# Patient Record
Sex: MALE | Race: WHITE | NOT HISPANIC OR LATINO | Employment: OTHER | URBAN - METROPOLITAN AREA
[De-identification: names, ages, dates, MRNs, and addresses within clinical notes are randomized per-mention and may not be internally consistent; named-entity substitution may affect disease eponyms.]

---

## 2017-07-25 ENCOUNTER — ALLSCRIPTS OFFICE VISIT (OUTPATIENT)
Dept: OTHER | Facility: OTHER | Age: 82
End: 2017-07-25

## 2017-07-25 ENCOUNTER — TRANSCRIBE ORDERS (OUTPATIENT)
Dept: LAB | Facility: CLINIC | Age: 82
End: 2017-07-25

## 2017-07-25 ENCOUNTER — APPOINTMENT (OUTPATIENT)
Dept: LAB | Facility: CLINIC | Age: 82
End: 2017-07-25
Payer: MEDICARE

## 2017-07-25 DIAGNOSIS — M35.3 POLYMYALGIA RHEUMATICA (HCC): ICD-10-CM

## 2017-07-25 LAB
BASOPHILS # BLD AUTO: 0.04 THOUSANDS/ΜL (ref 0–0.1)
BASOPHILS NFR BLD AUTO: 0 % (ref 0–1)
CRP SERPL QL: 16 MG/L
EOSINOPHIL # BLD AUTO: 0.24 THOUSAND/ΜL (ref 0–0.61)
EOSINOPHIL NFR BLD AUTO: 3 % (ref 0–6)
ERYTHROCYTE [DISTWIDTH] IN BLOOD BY AUTOMATED COUNT: 14.5 % (ref 11.6–15.1)
ERYTHROCYTE [SEDIMENTATION RATE] IN BLOOD: 23 MM/HOUR (ref 0–10)
HCT VFR BLD AUTO: 42.8 % (ref 36.5–49.3)
HGB BLD-MCNC: 14.4 G/DL (ref 12–17)
LYMPHOCYTES # BLD AUTO: 1.58 THOUSANDS/ΜL (ref 0.6–4.47)
LYMPHOCYTES NFR BLD AUTO: 17 % (ref 14–44)
MCH RBC QN AUTO: 31.6 PG (ref 26.8–34.3)
MCHC RBC AUTO-ENTMCNC: 33.6 G/DL (ref 31.4–37.4)
MCV RBC AUTO: 94 FL (ref 82–98)
MONOCYTES # BLD AUTO: 0.98 THOUSAND/ΜL (ref 0.17–1.22)
MONOCYTES NFR BLD AUTO: 11 % (ref 4–12)
NEUTROPHILS # BLD AUTO: 6.45 THOUSANDS/ΜL (ref 1.85–7.62)
NEUTS SEG NFR BLD AUTO: 69 % (ref 43–75)
NRBC BLD AUTO-RTO: 0 /100 WBCS
PLATELET # BLD AUTO: 288 THOUSANDS/UL (ref 149–390)
PMV BLD AUTO: 11.8 FL (ref 8.9–12.7)
RBC # BLD AUTO: 4.55 MILLION/UL (ref 3.88–5.62)
WBC # BLD AUTO: 9.32 THOUSAND/UL (ref 4.31–10.16)

## 2017-07-25 PROCEDURE — 86140 C-REACTIVE PROTEIN: CPT

## 2017-07-25 PROCEDURE — 86430 RHEUMATOID FACTOR TEST QUAL: CPT

## 2017-07-25 PROCEDURE — 36415 COLL VENOUS BLD VENIPUNCTURE: CPT

## 2017-07-25 PROCEDURE — 85025 COMPLETE CBC W/AUTO DIFF WBC: CPT

## 2017-07-25 PROCEDURE — 85652 RBC SED RATE AUTOMATED: CPT

## 2017-07-26 LAB — RHEUMATOID FACT SER QL LA: NEGATIVE

## 2017-09-11 DIAGNOSIS — M35.3 POLYMYALGIA RHEUMATICA (HCC): ICD-10-CM

## 2017-09-14 ENCOUNTER — ALLSCRIPTS OFFICE VISIT (OUTPATIENT)
Dept: OTHER | Facility: OTHER | Age: 82
End: 2017-09-14

## 2017-10-20 ENCOUNTER — GENERIC CONVERSION - ENCOUNTER (OUTPATIENT)
Dept: OTHER | Facility: OTHER | Age: 82
End: 2017-10-20

## 2018-01-10 NOTE — MISCELLANEOUS
Message   Date: 20 Oct 2017 9:57 AM EST, Recorded By: Calvin Starr For: Janes Joe   Caller: Osbaldo Jefferson, Self   Phone: [phone] (Home)   Reason: Other   Mr Naveen Gibson called from [place], has a new patient appointment with Rheumatologist in [place],   Dr Livan Buchanan, request for last office note & labs to be faxed, Brit Rogers, [phone],    I faxed internally  Active Problems    1  CAD (coronary artery disease) (414 00) (I25 10)   2  Gout (274 9) (M10 9)   3  Hyperlipidemia (272 4) (E78 5)   4  Hypertension (401 9) (I10)   5  Need for pneumococcal vaccination (V03 82) (Z23)   6  Need for prophylactic vaccination and inoculation against influenza (V04 81) (Z23)   7  Polymyalgia rheumatica (725) (M35 3)   8  Screening for genitourinary condition (V81 6) (Z13 89)    Current Meds   1  Allopurinol 300 MG Oral Tablet; TAKE 1 TABLET DAILY; Therapy: (Recorded:[id number]) to Recorded   2  AmLODIPine Besylate 5 MG Oral Tablet; TAKE 1 TABLET DAILY; Therapy: (Recorded:[id number]) to Recorded   3  Aspirin 81 MG CAPS; 1 tablet daily; Therapy: (Recorded:26Ziy7447) to Recorded   4  Centrum Oral Tablet; TAKE 1 TABLET DAILY; Therapy: (Recorded:35Wru6301) to Recorded   5  Glucosamine CAPS; 1 DAILY; Therapy: (Recorded:59Dei0255) to Recorded   6  PredniSONE 10 MG Oral Tablet; TAKE 1 TABLET DAILY; Therapy: [id number] to (Evaluate:49Sbp0811)  Requested for: [id number]; Last   Rx:[id number] Ordered   7  Vitamin D2 2000 UNIT Oral Tablet; 1 tablet daily; Therapy: (Recorded:30Ykb3980) to Recorded   8  Vytorin 10-40 MG Oral Tablet; TAKE 1 TABLET DAILY; Therapy: (Recorded:25Ulo0085) to Recorded    Allergies    1   No Known Drug Allergies    Signatures   Electronically signed by : DREW Duckworth ; Oct 20 2017  4:29PM EST                       (Author)

## 2018-01-12 VITALS
BODY MASS INDEX: 26.22 KG/M2 | HEIGHT: 68 IN | DIASTOLIC BLOOD PRESSURE: 82 MMHG | TEMPERATURE: 97.3 F | HEART RATE: 68 BPM | WEIGHT: 173 LBS | RESPIRATION RATE: 15 BRPM | SYSTOLIC BLOOD PRESSURE: 150 MMHG

## 2018-01-14 VITALS
TEMPERATURE: 96.4 F | HEIGHT: 68 IN | SYSTOLIC BLOOD PRESSURE: 130 MMHG | DIASTOLIC BLOOD PRESSURE: 62 MMHG | WEIGHT: 174.38 LBS | HEART RATE: 62 BPM | BODY MASS INDEX: 26.43 KG/M2 | RESPIRATION RATE: 16 BRPM

## 2018-07-02 ENCOUNTER — OFFICE VISIT (OUTPATIENT)
Dept: INTERNAL MEDICINE CLINIC | Facility: CLINIC | Age: 83
End: 2018-07-02
Payer: MEDICARE

## 2018-07-02 VITALS
WEIGHT: 171 LBS | SYSTOLIC BLOOD PRESSURE: 122 MMHG | HEIGHT: 68 IN | TEMPERATURE: 97.6 F | RESPIRATION RATE: 16 BRPM | HEART RATE: 86 BPM | BODY MASS INDEX: 25.91 KG/M2 | DIASTOLIC BLOOD PRESSURE: 80 MMHG

## 2018-07-02 DIAGNOSIS — M35.3 POLYMYALGIA RHEUMATICA (HCC): ICD-10-CM

## 2018-07-02 DIAGNOSIS — I10 ESSENTIAL HYPERTENSION: Primary | ICD-10-CM

## 2018-07-02 DIAGNOSIS — M1A.00X0 IDIOPATHIC CHRONIC GOUT WITHOUT TOPHUS, UNSPECIFIED SITE: ICD-10-CM

## 2018-07-02 DIAGNOSIS — I25.10 CORONARY ARTERY DISEASE INVOLVING NATIVE CORONARY ARTERY OF NATIVE HEART WITHOUT ANGINA PECTORIS: ICD-10-CM

## 2018-07-02 DIAGNOSIS — E78.5 HYPERLIPIDEMIA, UNSPECIFIED HYPERLIPIDEMIA TYPE: ICD-10-CM

## 2018-07-02 PROBLEM — M10.9 GOUT: Status: ACTIVE | Noted: 2017-07-25

## 2018-07-02 PROCEDURE — 99214 OFFICE O/P EST MOD 30 MIN: CPT | Performed by: INTERNAL MEDICINE

## 2018-07-02 RX ORDER — CHOLECALCIFEROL (VITAMIN D3) 125 MCG
1 TABLET ORAL DAILY
COMMUNITY
End: 2018-07-17 | Stop reason: DRUGHIGH

## 2018-07-02 RX ORDER — ALLOPURINOL 300 MG/1
1 TABLET ORAL DAILY
COMMUNITY

## 2018-07-02 RX ORDER — EZETIMIBE AND SIMVASTATIN 10; 40 MG/1; MG/1
1 TABLET ORAL DAILY
COMMUNITY
End: 2019-10-02 | Stop reason: ALTCHOICE

## 2018-07-02 RX ORDER — AMLODIPINE BESYLATE 5 MG/1
1 TABLET ORAL DAILY
COMMUNITY
End: 2019-05-30

## 2018-07-17 ENCOUNTER — OFFICE VISIT (OUTPATIENT)
Dept: INTERNAL MEDICINE CLINIC | Facility: CLINIC | Age: 83
End: 2018-07-17
Payer: MEDICARE

## 2018-07-17 VITALS
RESPIRATION RATE: 18 BRPM | HEIGHT: 68 IN | TEMPERATURE: 97.3 F | WEIGHT: 172 LBS | DIASTOLIC BLOOD PRESSURE: 84 MMHG | HEART RATE: 108 BPM | BODY MASS INDEX: 26.07 KG/M2 | SYSTOLIC BLOOD PRESSURE: 130 MMHG

## 2018-07-17 DIAGNOSIS — M35.3 POLYMYALGIA RHEUMATICA (HCC): ICD-10-CM

## 2018-07-17 DIAGNOSIS — I25.10 CORONARY ARTERY DISEASE INVOLVING NATIVE CORONARY ARTERY OF NATIVE HEART WITHOUT ANGINA PECTORIS: ICD-10-CM

## 2018-07-17 DIAGNOSIS — I10 ESSENTIAL HYPERTENSION: ICD-10-CM

## 2018-07-17 DIAGNOSIS — I48.92 ATRIAL FLUTTER, UNSPECIFIED TYPE (HCC): Primary | ICD-10-CM

## 2018-07-17 PROCEDURE — 93000 ELECTROCARDIOGRAM COMPLETE: CPT | Performed by: INTERNAL MEDICINE

## 2018-07-17 PROCEDURE — 99214 OFFICE O/P EST MOD 30 MIN: CPT | Performed by: INTERNAL MEDICINE

## 2018-07-17 RX ORDER — METOPROLOL SUCCINATE 50 MG/1
50 TABLET, EXTENDED RELEASE ORAL DAILY
Qty: 30 TABLET | Refills: 0 | Status: SHIPPED | OUTPATIENT
Start: 2018-07-17 | End: 2018-08-15 | Stop reason: SDUPTHER

## 2018-07-17 RX ORDER — MAG HYDROX/ALUMINUM HYD/SIMETH 400-400-40
1 SUSPENSION, ORAL (FINAL DOSE FORM) ORAL DAILY
COMMUNITY

## 2018-07-17 NOTE — PROGRESS NOTES
[facility] [place]      NAME: Bette Martínez  AGE: 80 y o  SEX: male  : 1935   MRN: [id number]    DATE: 2018  TIME: 4:43 PM    Assessment and Plan   1  Atrial flutter, unspecified type ([address] )  The patient's EKG showed atrial flutter with heart rate of around 100  He will be started on metoprolol and Eliquis  Echocardiogram has been requested  I spoke with Cardiology today and they will arrange for prompt follow-up  - metoprolol succinate (TOPROL-XL) 50 mg 24 hr tablet; Take 1 tablet (50 mg total) by mouth daily  Dispense: 30 tablet; Refill: 0  - apixaban (ELIQUIS) 5 mg; Take 1 tablet (5 mg total) by mouth 2 (two) times a day  Dispense: 60 tablet; Refill: 0  - POCT ECG  - Echo complete with contrast if indicated; Future    2  Coronary artery disease involving native coronary artery of native heart without angina pectoris  No angina  On appropriate risk factor modification  3  Essential hypertension  Well controlled    4  Polymyalgia rheumatica (HCC)  Stable on low-dose prednisone  The patient has atrial flutter which is probably mildly symptomatic  We discussed medical therapy for this  We discussed the need for anticoagulation for stroke prevention  We also discussed briefly ablation therapy  We arrangements will be made for evaluation by 1 of the electrophysiologists  Return to office in:  As scheduled  Chief Complaint     Chief Complaint   Patient presents with    Irregular Heart Beat       History of Present Illness     The patient returned to the office for evaluation of irregular heartbeat  He says that usually his heart rate is around 55  Recently when he has been monitoring his pressure he has had a heart rate of around 100  This is been irregular  He has had no chest pain, shortness of breath, PND, orthopnea, or edema  He does feel mildly lightheaded  He feels somewhat weaker than usual   His exercise tolerance has deteriorated somewhat    He was in the habit of walking 2 miles daily some of which was uphill  He stopped this last week because he did not have the energy  He was seen by his cardiologist in [place] in May and everything was okay at that time  The following portions of the patient's history were reviewed and updated as appropriate: allergies, current medications, past family history, past medical history, past social history, past surgical history and problem list     Review of Systems   Review of Systems   Constitutional: Positive for activity change and fatigue  Negative for appetite change, chills, diaphoresis, fever and unexpected weight change  HENT: Negative for congestion, ear pain, postnasal drip, rhinorrhea, sore throat and trouble swallowing  Eyes: Negative for pain, discharge, redness and visual disturbance  Respiratory: Negative for cough, shortness of breath and wheezing  Cardiovascular: Negative  Gastrointestinal: Negative  Endocrine: Negative  Genitourinary: Negative for difficulty urinating, dysuria, frequency, hematuria and urgency  Musculoskeletal: Negative for arthralgias, gait problem, joint swelling and myalgias  Skin: Negative for rash  Neurological: Negative for dizziness, speech difficulty, weakness, light-headedness, numbness and headaches  Hematological: Negative  Psychiatric/Behavioral: Negative for confusion, decreased concentration, dysphoric mood and sleep disturbance  The patient is not nervous/anxious          Active Problem List     Patient Active Problem List   Diagnosis    Polymyalgia rheumatica (HCC)    Hypertension    Hyperlipidemia    Gout    CAD (coronary artery disease)    Atrial flutter (HCC)       Objective   /84 (BP Location: Left arm, Patient Position: Sitting, Cuff Size: Standard)   Pulse (!) 108   Temp (!) 97 3 °F (36 3 °C) (Tympanic)   Resp 18   Ht 5' 8" (1 727 m)   Wt 78 kg (172 lb)   BMI 26 15 kg/m²     Physical Exam   Constitutional: He is oriented to person, place, and time  He appears well-developed and well-nourished  No distress  HENT:   Head: Normocephalic and atraumatic  Neck: Neck supple  No JVD present  No tracheal deviation present  No thyromegaly present  Cardiovascular: Normal rate, normal heart sounds and intact distal pulses  Exam reveals no gallop and no friction rub  No murmur heard  The cardiac rhythm was irregular  Pulmonary/Chest: Effort normal and breath sounds normal  He has no wheezes  He has no rales  He exhibits no tenderness  Abdominal: Soft  Bowel sounds are normal  He exhibits no distension and no mass  There is no tenderness  There is no rebound  Musculoskeletal: Normal range of motion  He exhibits no edema or tenderness  Lymphadenopathy:     He has no cervical adenopathy  Neurological: He is alert and oriented to person, place, and time  Skin: Skin is warm and dry  Psychiatric: He has a normal mood and affect  His behavior is normal  Judgment and thought content normal        Pertinent Laboratory/Diagnostic Studies:  No visits with results within 3 Month(s) from this visit     Latest known visit with results is:   Appointment on 07/25/2017   Component Date Value Ref Range Status    WBC 07/25/2017 9 32  4 31 - 10 16 Thousand/uL Final    RBC 07/25/2017 4 55  3 88 - 5 62 Million/uL Final    Hemoglobin 07/25/2017 14 4  12 0 - 17 0 g/dL Final    Hematocrit 07/25/2017 42 8  36 5 - 49 3 % Final    MCV 07/25/2017 94  82 - 98 fL Final    MCH 07/25/2017 31 6  26 8 - 34 3 pg Final    MCHC 07/25/2017 33 6  31 4 - 37 4 g/dL Final    RDW 07/25/2017 14 5  11 6 - 15 1 % Final    MPV 07/25/2017 11 8  8 9 - 12 7 fL Final    Platelets 12/98/4243 288  149 - 390 Thousands/uL Final    nRBC 07/25/2017 0  /100 WBCs Final    Neutrophils Relative 07/25/2017 69  43 - 75 % Final    Lymphocytes Relative 07/25/2017 17  14 - 44 % Final    Monocytes Relative 07/25/2017 11  4 - 12 % Final    Eosinophils Relative 07/25/2017 3  0 - 6 % Final    Basophils Relative 07/25/2017 0  0 - 1 % Final    Neutrophils Absolute 07/25/2017 6 45  1 85 - 7 62 Thousands/µL Final    Lymphocytes Absolute 07/25/2017 1 58  0 60 - 4 47 Thousands/µL Final    Monocytes Absolute 07/25/2017 0 98  0 17 - 1 22 Thousand/µL Final    Eosinophils Absolute 07/25/2017 0 24  0 00 - 0 61 Thousand/µL Final    Basophils Absolute 07/25/2017 0 04  0 00 - 0 10 Thousands/µL Final    Sed Rate 07/25/2017 23* 0 - 10 mm/hour Final    Rheumatoid Factor 07/25/2017 Negative  Negative Final    CRP 07/25/2017 16 0* <3 0 mg/L Final           Current Medications     Current Outpatient Prescriptions:     allopurinol (ZYLOPRIM) 300 mg tablet, Take 1 tablet by mouth daily, Disp: , Rfl:     amLODIPine (NORVASC) 5 mg tablet, Take 1 tablet by mouth daily, Disp: , Rfl:     aspirin 81 MG tablet, Take 1 tablet by mouth daily, Disp: , Rfl:     Cholecalciferol (VITAMIN D3) 5000 units CAPS, Take 1 capsule by mouth daily, Disp: , Rfl:     ezetimibe-simvastatin (VYTORIN) 10-40 mg per tablet, Take 1 tablet by mouth daily, Disp: , Rfl:     Misc Natural Products (GLUCOSAMINE CHOND COMPLEX/MSM PO), Take by mouth daily, Disp: , Rfl:     Multiple Vitamins-Minerals (MULTIVITAMIN ADULT PO), Take 1 tablet by mouth daily, Disp: , Rfl:     PredniSONE 1 MG TBEC, Take 2 tablets (2 mg total) by mouth daily (Patient taking differently: Take 1 each by mouth daily Taking calcium with prednisone ), Disp: 60 tablet, Rfl: 1    apixaban (ELIQUIS) 5 mg, Take 1 tablet (5 mg total) by mouth 2 (two) times a day, Disp: 60 tablet, Rfl: 0    metoprolol succinate (TOPROL-XL) 50 mg 24 hr tablet, Take 1 tablet (50 mg total) by mouth daily, Disp: 30 tablet, Rfl: 0      Camacho Ugalde MD

## 2018-08-03 ENCOUNTER — HOSPITAL ENCOUNTER (OUTPATIENT)
Dept: NON INVASIVE DIAGNOSTICS | Facility: CLINIC | Age: 83
Discharge: HOME | End: 2018-08-03
Payer: MEDICARE

## 2018-08-03 DIAGNOSIS — I48.92 ATRIAL FLUTTER, UNSPECIFIED TYPE (HCC): ICD-10-CM

## 2018-08-03 PROCEDURE — 93306 TTE W/DOPPLER COMPLETE: CPT | Performed by: INTERNAL MEDICINE

## 2018-08-03 PROCEDURE — 93306 TTE W/DOPPLER COMPLETE: CPT

## 2018-08-15 ENCOUNTER — OFFICE VISIT (OUTPATIENT)
Dept: CARDIOLOGY CLINIC | Facility: CLINIC | Age: 83
End: 2018-08-15
Payer: MEDICARE

## 2018-08-15 VITALS
HEART RATE: 55 BPM | DIASTOLIC BLOOD PRESSURE: 64 MMHG | SYSTOLIC BLOOD PRESSURE: 116 MMHG | BODY MASS INDEX: 26.24 KG/M2 | RESPIRATION RATE: 16 BRPM | WEIGHT: 173.1 LBS | HEIGHT: 68 IN

## 2018-08-15 DIAGNOSIS — I10 ESSENTIAL HYPERTENSION: ICD-10-CM

## 2018-08-15 DIAGNOSIS — I48.92 ATRIAL FLUTTER, UNSPECIFIED TYPE (HCC): Primary | ICD-10-CM

## 2018-08-15 DIAGNOSIS — E78.2 MIXED HYPERLIPIDEMIA: ICD-10-CM

## 2018-08-15 DIAGNOSIS — I25.810 CORONARY ARTERY DISEASE INVOLVING CORONARY BYPASS GRAFT OF NATIVE HEART WITHOUT ANGINA PECTORIS: ICD-10-CM

## 2018-08-15 DIAGNOSIS — I50.22 CHRONIC SYSTOLIC HEART FAILURE (HCC): ICD-10-CM

## 2018-08-15 PROCEDURE — 93000 ELECTROCARDIOGRAM COMPLETE: CPT | Performed by: INTERNAL MEDICINE

## 2018-08-15 PROCEDURE — 99205 OFFICE O/P NEW HI 60 MIN: CPT | Performed by: INTERNAL MEDICINE

## 2018-08-15 RX ORDER — METOPROLOL SUCCINATE 50 MG/1
50 TABLET, EXTENDED RELEASE ORAL DAILY
Qty: 30 TABLET | Refills: 6 | Status: SHIPPED | OUTPATIENT
Start: 2018-08-15 | End: 2018-10-03 | Stop reason: SDUPTHER

## 2018-08-15 NOTE — LETTER
August 15, 2018     Yaritza Elam MD  [address]  [facility]    Patient: Michelet Jiménez   YOB: 1935   Date of Visit: 8/15/2018       Dear Dr Jorge A Mustafa: Thank you for referring Michelet Jiménez to me for evaluation  Below are my notes for this consultation  If you have questions, please do not hesitate to call me  I look forward to following your patient along with you  Sincerely,        Jaron Petit MD        CC: DARREN Soler MD  8/15/2018 12:58 PM  Sign at close encounter                                             Cardiology Consultation     Michelet Jiménez  [id number]  1935  [address]    1  Atrial flutter, unspecified type (HCC)  POCT ECG    apixaban (ELIQUIS) 5 mg    metoprolol succinate (TOPROL-XL) 50 mg 24 hr tablet   2  Mixed hyperlipidemia     3  Essential hypertension     4   Coronary artery disease involving coronary bypass graft of native heart without angina pectoris         History of present illness    Patient has been referred to me by Dr Yaritza Elam for management of atrial flutter     Recent diagnosis of same after May, 2018  In May 2018, had a cardiac check in [place] and was told all was normal  Came back to [facility] and felt tachycardia when assessment revealed that he is in flutter  Was started on eliquis and metoprolol    He was smoker from age 13 to 40 but quit since then  He was very active - triathlon for many years, competed all across the [place]   Also [place]am [place]    Multiple cardiac conditions  1990s had balloon angioplasty  CABG in 1995  Then PCI to heart in 1999    The patient is not complaining of anginal like chest pain or chest pressure  There is no worsening orthopnea, paroxysmal nocturnal dyspnea  There is no leg swelling      Patient does get palpitation from time to time   There is no history of presyncope or syncope  There is no history of transient  lightheadedness  When patient has these palpitations, it is associated with exertional intolerance      Has a history of PMR diagnosed about 1 year ago  Prednisone tapered 1 mg/ month till it was discontinued  However pain and stiffness came back  On prednisone 2 mg/d now          Patient Active Problem List   Diagnosis    Polymyalgia rheumatica ([address] )    Hypertension    Hyperlipidemia    Gout    Coronary artery disease involving coronary bypass graft    Atrial flutter ([facility] )     No past medical history on file  Social History     Social History    Marital status: /Civil Union     Spouse name: N/A    Number of children: N/A    Years of education: N/A     Occupational History    Not on file       Social History Main Topics    Smoking status: Former Smoker    Smokeless tobacco: Never Used    Alcohol use Yes    Drug use: No    Sexual activity: Not on file     Other Topics Concern    Not on file     Social History Narrative    No narrative on file      Family History   Problem Relation Age of Onset    Alzheimer's disease Mother     Cancer Father      Past Surgical History:   Procedure Laterality Date    ANGIOPLASTY      CATARACT EXTRACTION Bilateral     CORONARY ARTERY BYPASS GRAFT         Current Outpatient Prescriptions:     allopurinol (ZYLOPRIM) 300 mg tablet, Take 1 tablet by mouth daily, Disp: , Rfl:     amLODIPine (NORVASC) 5 mg tablet, Take 1 tablet by mouth daily, Disp: , Rfl:     apixaban (ELIQUIS) 5 mg, Take 1 tablet (5 mg total) by mouth 2 (two) times a day, Disp: 60 tablet, Rfl: 0    Cholecalciferol (VITAMIN D3) 5000 units CAPS, Take 1 capsule by mouth daily, Disp: , Rfl:     ezetimibe-simvastatin (VYTORIN) 10-40 mg per tablet, Take 1 tablet by mouth daily, Disp: , Rfl:     metoprolol succinate (TOPROL-XL) 50 mg 24 hr tablet, Take 1 tablet (50 mg total) by mouth daily, Disp: 30 tablet, Rfl: 0    Misc Natural Products (GLUCOSAMINE CHOND COMPLEX/MSM PO), Take by mouth daily, Disp: , Rfl:     Multiple Vitamins-Minerals (MULTIVITAMIN ADULT PO), Take 1 tablet by mouth daily, Disp: , Rfl:     PredniSONE 1 MG TBEC, Take 2 tablets (2 mg total) by mouth daily (Patient taking differently: Take 1 each by mouth daily Taking calcium with prednisone ), Disp: 60 tablet, Rfl: 1    aspirin 81 MG tablet, Take 1 tablet by mouth daily, Disp: , Rfl:   No Known Allergies  Vitals:    08/15/18 1118   BP: 116/64   Pulse: 55   Resp: 16   Weight: 78 5 kg (173 lb 1 6 oz)   Height: 5' 8" (1 727 m)       Labs:  No results found for: NA, K, CL, CO2, BUN, CREATININE, GLUCOSE, CALCIUM  No results found for: CKTOTAL, CKMB, CKMBINDEX, TROPONINI  Lab Results   Component Value Date    WBC 9 32 07/25/2017    HGB 14 4 07/25/2017    HCT 42 8 07/25/2017    MCV 94 07/25/2017     07/25/2017     No results found for: CHOL, TRIG, HDL, LDLDIRECT  Imaging: No results found  Review of Systems:  Review of Systems   All other systems reviewed and are negative  as described in my history of present illness        Physical Exam:  Physical Exam   Constitutional: He is oriented to person, place, and time  He appears well-developed and well-nourished  No distress  Not in any distress at the current time   HENT:   Head: Normocephalic and atraumatic  Right Ear: External ear normal    Left Ear: External ear normal    Nose: Nose normal    Mouth/Throat: Uvula is midline and mucous membranes are normal    Posterior pharynx is crowded   Eyes: Conjunctivae, EOM and lids are normal  Pupils are equal, round, and reactive to light  Left eye exhibits no discharge  No pallor  No cyanosis  No icterus   Neck: Trachea normal and normal range of motion  Neck supple  No JVD present  Carotid bruit is not present  No thyromegaly present     No jugular lymphadenopathy   Cardiovascular: Normal rate, regular rhythm, S1 normal, S2 normal, normal heart sounds, intact distal pulses and normal pulses  PMI is not displaced  Exam reveals no gallop, no S3, no S4 and no friction rub  No murmur heard  Regular rhythm - flutter with 4:1 block    Pulmonary/Chest: Effort normal and breath sounds normal  No accessory muscle usage  No respiratory distress  He has no decreased breath sounds  He has no wheezes  He has no rhonchi  He has no rales  He exhibits no tenderness  Abdominal: Soft  Normal appearance and bowel sounds are normal  He exhibits no distension and no mass  There is no splenomegaly or hepatomegaly  There is no tenderness  Musculoskeletal: Normal range of motion  He exhibits edema  He exhibits no tenderness or deformity  Mild edema, he says it is chronic   Lymphadenopathy:     He has no cervical adenopathy  Neurological: He is alert and oriented to person, place, and time  Facial symmetry is retained  Extraocular movements are retained  Head neck tongue and palate movement are retained and symmetric   Skin: Skin is intact  No abrasion, no lesion and no rash noted  No erythema  Nails show no clubbing  Psychiatric: He has a normal mood and affect  His speech is normal and behavior is normal  Thought content normal          Discussion/Summary:    1   Atrial flutter with slow ventricular rate  The patient does have symptoms of  Palpitations at times   Shortness of breath with rapid heart rate  Exertional intolerance      We had a very detailed discussion with respect to atrial flutter    Natural history of disease  More than 90 percent of atrial flutter is from the caval tricuspid isthmus  Unless there has been open heart surgery or prior left-sided ablation it is predominantly a right atrial disease  This is a fixed abnormal electrical circuit  Ablation is the class 1 recommendation for this condition  Antiarrhythmics and rate control agents will only slow the flutter rate and not a long-term solution most of the time    Ablation of right-sided flutter carries a 96 percent chance of success  There is a 2-4 percent chance of recurrence  There is 1 percent chance of serious complication    He has CABG but this appears to be right sided flutter       Co- existence with atrial fibrillation  Sometimes atrial flutter can happen in endurance athletes  Most of the time it is associated with a high risk of atrial fibrillation  All prior records were reviewed to look for any evidence of atrial fibrillation  If indeed patient has  atrial fibrillation -antiarrhythmic needs to be started after atrial flutter ablation  Alternatively patient should undergo both flutter and fibrillation ablation        Anticoagulation  Atrial flutter presents with the same high risk of stroke as atrial fibrillation  Chads Vasc score is used to assess the same  The patient's chads Vasc score is - 5  Congestive heart failure - EF 45% per echo  Hypertension  Vascular disease  Age more than 75  He is on eliquis      Rate control agents  This can increase the degree of AV block and reduce the ventricular rate in atrial flutter  This is however a short time effort getting the patient prepped for a future ablation  Beta-blockers are preferable as they can be used in heart failure  Calcium channel blockers can be used in patients with asthma and peripheral vascular disease  Continue metoprolol succinate 50 mg/day      Rhythm control agent  Rhythm control agents acutely do not have significant role in atrial flutter  Flecainide and propafenone can convert atrial fibrillation to atrial flutter with more rapid ventricular conduction  Sotalol and dofetilide can be used with the goal to cardiovert the patient to sinus rhythm-the intent is that it was slow either the slow pathway or the fast pathway differentially so that flutter cannot be initiated  Amiodarone has been used with an intent to lower rate more than rhythm  Each of them has their side effects which needs to be followed carefully        DC cardioversion  Short term procedure only to  control hemodynamics acutely  This should never be a long-time procedure with repeated cardioversions  If patient is indeed been cardioverted should proceed with ablation fairly soon after it      Follow-up for atrial fibrillation  If patient does not feel palpitation or is relatively asymptomatic after flutter ablation  There is a chance of developing fibrillation with similar is symptomatic course until heart failure presents  Hence some form of monitoring likely LINQ needs use        Ablation  Ablation is the recommendation for same  It is an abnormal and fixed electrical circuit  Although flutter around scar is possible the highest risk if for CTI dependent flutter           Summary of my recommendation:  - continue with metoprolol for now to avoid RVR  - Plan for flutter ablation, Macro -re-entrant flutter, Suspect right sided, be prepared for left side if needed (post CABG)  NAVX, DEON before procedure  - High CHADS VAsc score, hold eliquis the day before and day of ablation              2  CAD, post CABG, post PCI  CHF, systolic, NYHA -II  LVEF -45%    He is very active and used to do Iron [employer] triathlon  Now does 2 miles a day    On beta blockers now  On asa, vytorin    No symptoms   Need follow up with heart failure team            3 Hyperlipidemia  On vytorin  Well controlled        4   PMR  Recently off steroid  Symptoms came back  Now back on prednisone 2 mg/d

## 2018-08-15 NOTE — PROGRESS NOTES
Cardiology Consultation     Ayesha Blakely  [id number]  1935  [facility] [address]    1  Atrial flutter, unspecified type (HCC)  POCT ECG    apixaban (ELIQUIS) 5 mg    metoprolol succinate (TOPROL-XL) 50 mg 24 hr tablet   2  Mixed hyperlipidemia     3  Essential hypertension     4   Coronary artery disease involving coronary bypass graft of native heart without angina pectoris         History of present illness    Patient has been referred to me by Dr Corwin Pittman for management of atrial flutter     Recent diagnosis of same after May, 2018  In May 2018, had a cardiac check in [place] and was told all was normal  Came back to [facility] and felt tachycardia when assessment revealed that he is in flutter  Was started on eliquis and metoprolol    He was smoker from age 13 to 40 but quit since then  He was very active - triathlon for many years, competed all across the [place]   Also [place]am [place]    Multiple cardiac conditions  1990s had balloon angioplasty  CABG in 1995  Then PCI to heart in 1999    The patient is not complaining of anginal like chest pain or chest pressure  There is no worsening orthopnea, paroxysmal nocturnal dyspnea  There is no leg swelling      Patient does get palpitation from time to time   There is no history of presyncope or syncope  There is no history of transient  lightheadedness  When patient has these palpitations, it is associated with exertional intolerance      Has a history of PMR diagnosed about 1 year ago  Prednisone tapered 1 mg/ month till it was discontinued  However pain and stiffness came back  On prednisone 2 mg/d now          Patient Active Problem List   Diagnosis    [facility]ymyalgia rhe[address]a ([facility] [address]    Hypertension    Hyperlipidemia    Gout    Coronary artery disease involving coronary bypass graft    Atrial flutter ([facility] )     No past medical history on file  Social History     Social History    Marital status: /Civil Union     Spouse name: N/A    Number of children: N/A    Years of education: N/A     Occupational History    Not on file       Social History Main Topics    Smoking status: Former Smoker    Smokeless tobacco: Never Used    Alcohol use Yes    Drug use: No    Sexual activity: Not on file     Other Topics Concern    Not on file     Social History Narrative    No narrative on file      Family History   Problem Relation Age of Onset    Alzheimer's disease Mother     Cancer Father      Past Surgical History:   Procedure Laterality Date    ANGIOPLASTY      CATARACT EXTRACTION Bilateral     CORONARY ARTERY BYPASS GRAFT         Current Outpatient Prescriptions:     allopurinol (ZYLOPRIM) 300 mg tablet, Take 1 tablet by mouth daily, Disp: , Rfl:     amLODIPine (NORVASC) 5 mg tablet, Take 1 tablet by mouth daily, Disp: , Rfl:     apixaban (ELIQUIS) 5 mg, Take 1 tablet (5 mg total) by mouth 2 (two) times a day, Disp: 60 tablet, Rfl: 0    Cholecalciferol (VITAMIN D3) 5000 units CAPS, Take 1 capsule by mouth daily, Disp: , Rfl:     ezetimibe-simvastatin (VYTORIN) 10-40 mg per tablet, Take 1 tablet by mouth daily, Disp: , Rfl:     metoprolol succinate (TOPROL-XL) 50 mg 24 hr tablet, Take 1 tablet (50 mg total) by mouth daily, Disp: 30 tablet, Rfl: 0    Misc Natural Products (GLUCOSAMINE CHOND COMPLEX/MSM PO), Take by mouth daily, Disp: , Rfl:     Multiple Vitamins-Minerals (MULTIVITAMIN ADULT PO), Take 1 tablet by mouth daily, Disp: , Rfl:     PredniSONE 1 MG TBEC, Take 2 tablets (2 mg total) by mouth daily (Patient taking differently: Take 1 each by mouth daily Taking calcium with prednisone ), Disp: 60 tablet, Rfl: 1    aspirin 81 MG tablet, Take 1 tablet by mouth daily, Disp: , Rfl:   No Known Allergies  Vitals:    08/15/18 1118   BP: 116/64   Pulse: 55   Resp: 16   Weight: 78 5 kg (173 lb 1 6 oz)   Height: 5' 8" (1 727 m)       Labs:  No results found for: NA, K, CL, CO2, BUN, CREATININE, GLUCOSE, CALCIUM  No results found for: CKTOTAL, CKMB, CKMBINDEX, TROPONINI  Lab Results   Component Value Date    WBC 9 32 07/25/2017    HGB 14 4 07/25/2017    HCT 42 8 07/25/2017    MCV 94 07/25/2017     07/25/2017     No results found for: CHOL, TRIG, HDL, LDLDIRECT  Imaging: No results found  Review of Systems:  Review of Systems   All other systems reviewed and are negative  as described in my history of present illness        Physical Exam:  Physical Exam   Constitutional: He is oriented to person, place, and time  He appears well-developed and well-nourished  No distress  Not in any distress at the current time   HENT:   Head: Normocephalic and atraumatic  Right Ear: External ear normal    Left Ear: External ear normal    Nose: Nose normal    Mouth/Throat: Uvula is midline and mucous membranes are normal    Posterior pharynx is crowded   Eyes: Conjunctivae, EOM and lids are normal  Pupils are equal, round, and reactive to light  Left eye exhibits no discharge  No pallor  No cyanosis  No icterus   Neck: Trachea normal and normal range of motion  Neck supple  No JVD present  Carotid bruit is not present  No thyromegaly present  No jugular lymphadenopathy   Cardiovascular: Normal rate, regular rhythm, S1 normal, S2 normal, normal heart sounds, intact distal pulses and normal pulses  PMI is not displaced  Exam reveals no gallop, no S3, no S4 and no friction rub  No murmur heard  Regular rhythm - flutter with 4:1 block    Pulmonary/Chest: Effort normal and breath sounds normal  No accessory muscle usage  No respiratory distress  He has no decreased breath sounds  He has no wheezes  He has no rhonchi  He has no rales  He exhibits no tenderness  Abdominal: Soft  Normal appearance and bowel sounds are normal  He exhibits no distension and no mass  There is no splenomegaly or hepatomegaly   There is no tenderness  Musculoskeletal: Normal range of motion  He exhibits edema  He exhibits no tenderness or deformity  Mild edema, he says it is chronic   Lymphadenopathy:     He has no cervical adenopathy  Neurological: He is alert and oriented to person, place, and time  Facial symmetry is retained  Extraocular movements are retained  Head neck tongue and palate movement are retained and symmetric   Skin: Skin is intact  No abrasion, no lesion and no rash noted  No erythema  Nails show no clubbing  Psychiatric: He has a normal mood and affect  His speech is normal and behavior is normal  Thought content normal          Discussion/Summary:    1   Atrial flutter with slow ventricular rate  The patient does have symptoms of  Palpitations at times   Shortness of breath with rapid heart rate  Exertional intolerance      We had a very detailed discussion with respect to atrial flutter    Natural history of disease  More than 90 percent of atrial flutter is from the caval tricuspid isthmus  Unless there has been open heart surgery or prior left-sided ablation it is predominantly a right atrial disease  This is a fixed abnormal electrical circuit  Ablation is the class 1 recommendation for this condition  Antiarrhythmics and rate control agents will only slow the flutter rate and not a long-term solution most of the time    Ablation of right-sided flutter carries a 96 percent chance of success  There is a 2-4 percent chance of recurrence  There is 1 percent chance of serious complication    He has CABG but this appears to be right sided flutter       Co- existence with atrial fibrillation  Sometimes atrial flutter can happen in endurance athletes  Most of the time it is associated with a high risk of atrial fibrillation  All prior records were reviewed to look for any evidence of atrial fibrillation  If indeed patient has  atrial fibrillation -antiarrhythmic needs to be started after atrial flutter ablation  Alternatively patient should undergo both flutter and fibrillation ablation        Anticoagulation  Atrial flutter presents with the same high risk of stroke as atrial fibrillation  Chads Vasc score is used to assess the same  The patient's chads Vasc score is - 5  Congestive heart failure - EF 45% per echo  Hypertension  Vascular disease  Age more than 75  He is on eliquis      Rate control agents  This can increase the degree of AV block and reduce the ventricular rate in atrial flutter  This is however a short time effort getting the patient prepped for a future ablation  Beta-blockers are preferable as they can be used in heart failure  Calcium channel blockers can be used in patients with asthma and peripheral vascular disease  Continue metoprolol succinate 50 mg/day      Rhythm control agent  Rhythm control agents acutely do not have significant role in atrial flutter  Flecainide and propafenone can convert atrial fibrillation to atrial flutter with more rapid ventricular conduction  Sotalol and dofetilide can be used with the goal to cardiovert the patient to sinus rhythm-the intent is that it was slow either the slow pathway or the fast pathway differentially so that flutter cannot be initiated  Amiodarone has been used with an intent to lower rate more than rhythm  Each of them has their side effects which needs to be followed carefully        DC cardioversion  Short term procedure only to  control hemodynamics acutely  This should never be a long-time procedure with repeated cardioversions  If patient is indeed been cardioverted should proceed with ablation fairly soon after it      Follow-up for atrial fibrillation  If patient does not feel palpitation or is relatively asymptomatic after flutter ablation  There is a chance of developing fibrillation with similar is symptomatic course until heart failure presents  Hence some form of monitoring likely LINQ needs use        Ablation  Ablation is the recommendation for same  It is an abnormal and fixed electrical circuit  Although flutter around scar is possible the highest risk if for CTI dependent flutter           Summary of my recommendation:  - continue with metoprolol for now to avoid RVR  - Plan for flutter ablation, Macro -re-entrant flutter, Suspect right sided, be prepared for left side if needed (post CABG)  NAVX, DEON before procedure  - High CHADS VAsc score, hold eliquis the day before and day of ablation              2  CAD, post CABG, post PCI  CHF, systolic, NYHA -II  LVEF -45%    He is very active and used to do Iron man triathlon  Now does 2 miles a day    On beta blockers now  On asa, vytorin    No symptoms   Need follow up with heart failure team            3 Hyperlipidemia  On vytorin  Well controlled        4   PMR  Recently off steroid  Symptoms came back  Now back on prednisone 2 mg/d

## 2018-08-21 DIAGNOSIS — I48.92 ATRIAL FLUTTER, UNSPECIFIED TYPE (HCC): Primary | ICD-10-CM

## 2018-09-12 ENCOUNTER — ANESTHESIA (OUTPATIENT)
Dept: SURGERY | Facility: HOSPITAL | Age: 83
End: 2018-09-12

## 2018-09-12 ENCOUNTER — ANESTHESIA (OUTPATIENT)
Dept: NON INVASIVE DIAGNOSTICS | Facility: HOSPITAL | Age: 83
End: 2018-09-12
Payer: MEDICARE

## 2018-09-12 ENCOUNTER — APPOINTMENT (OUTPATIENT)
Dept: NON INVASIVE DIAGNOSTICS | Facility: HOSPITAL | Age: 83
End: 2018-09-12
Attending: INTERNAL MEDICINE
Payer: MEDICARE

## 2018-09-12 ENCOUNTER — HOSPITAL ENCOUNTER (OUTPATIENT)
Dept: NON INVASIVE DIAGNOSTICS | Facility: HOSPITAL | Age: 83
Discharge: HOME | End: 2018-09-13
Attending: INTERNAL MEDICINE | Admitting: INTERNAL MEDICINE
Payer: MEDICARE

## 2018-09-12 ENCOUNTER — ANESTHESIA EVENT (OUTPATIENT)
Dept: NON INVASIVE DIAGNOSTICS | Facility: HOSPITAL | Age: 83
End: 2018-09-12
Payer: MEDICARE

## 2018-09-12 ENCOUNTER — ANESTHESIA EVENT (OUTPATIENT)
Dept: SURGERY | Facility: HOSPITAL | Age: 83
End: 2018-09-12

## 2018-09-12 DIAGNOSIS — I48.92 ATRIAL FLUTTER, UNSPECIFIED TYPE (HCC): ICD-10-CM

## 2018-09-12 LAB
ANION GAP SERPL CALCULATED.3IONS-SCNC: 7 MMOL/L (ref 4–13)
ATRIAL RATE: 202 BPM
ATRIAL RATE: 62 BPM
BASOPHILS # BLD AUTO: 0.04 THOUSANDS/ΜL (ref 0–0.1)
BASOPHILS NFR BLD AUTO: 1 % (ref 0–1)
BUN SERPL-MCNC: 23 MG/DL (ref 5–25)
CALCIUM SERPL-MCNC: 8.9 MG/DL (ref 8.3–10.1)
CHLORIDE SERPL-SCNC: 105 MMOL/L (ref 100–108)
CO2 SERPL-SCNC: 26 MMOL/L (ref 21–32)
CREAT SERPL-MCNC: 0.77 MG/DL (ref 0.6–1.3)
EOSINOPHIL # BLD AUTO: 0.13 THOUSAND/ΜL (ref 0–0.61)
EOSINOPHIL NFR BLD AUTO: 2 % (ref 0–6)
ERYTHROCYTE [DISTWIDTH] IN BLOOD BY AUTOMATED COUNT: 14.1 % (ref 11.6–15.1)
GFR SERPL CREATININE-BSD FRML MDRD: 85 ML/MIN/1.73SQ M
GLUCOSE P FAST SERPL-MCNC: 112 MG/DL (ref 65–99)
GLUCOSE SERPL-MCNC: 112 MG/DL (ref 65–140)
HCT VFR BLD AUTO: 42.9 % (ref 36.5–49.3)
HGB BLD-MCNC: 14 G/DL (ref 12–17)
IMM GRANULOCYTES # BLD AUTO: 0.04 THOUSAND/UL (ref 0–0.2)
IMM GRANULOCYTES NFR BLD AUTO: 1 % (ref 0–2)
INR PPP: 1 (ref 0.86–1.17)
LYMPHOCYTES # BLD AUTO: 1.51 THOUSANDS/ΜL (ref 0.6–4.47)
LYMPHOCYTES NFR BLD AUTO: 20 % (ref 14–44)
MCH RBC QN AUTO: 31.3 PG (ref 26.8–34.3)
MCHC RBC AUTO-ENTMCNC: 32.6 G/DL (ref 31.4–37.4)
MCV RBC AUTO: 96 FL (ref 82–98)
MONOCYTES # BLD AUTO: 0.77 THOUSAND/ΜL (ref 0.17–1.22)
MONOCYTES NFR BLD AUTO: 10 % (ref 4–12)
NEUTROPHILS # BLD AUTO: 4.9 THOUSANDS/ΜL (ref 1.85–7.62)
NEUTS SEG NFR BLD AUTO: 66 % (ref 43–75)
NRBC BLD AUTO-RTO: 0 /100 WBCS
P AXIS: 269 DEGREES
PLATELET # BLD AUTO: 229 THOUSANDS/UL (ref 149–390)
PMV BLD AUTO: 10.7 FL (ref 8.9–12.7)
POTASSIUM SERPL-SCNC: 4.1 MMOL/L (ref 3.5–5.3)
PROTHROMBIN TIME: 13.3 SECONDS (ref 11.8–14.2)
QRS AXIS: 46 DEGREES
QRS AXIS: 68 DEGREES
QRSD INTERVAL: 104 MS
QRSD INTERVAL: 96 MS
QT INTERVAL: 448 MS
QT INTERVAL: 638 MS
QTC INTERVAL: 436 MS
QTC INTERVAL: 649 MS
RBC # BLD AUTO: 4.48 MILLION/UL (ref 3.88–5.62)
SODIUM SERPL-SCNC: 138 MMOL/L (ref 136–145)
T WAVE AXIS: -49 DEGREES
T WAVE AXIS: 61 DEGREES
VENTRICULAR RATE: 57 BPM
VENTRICULAR RATE: 62 BPM
WBC # BLD AUTO: 7.39 THOUSAND/UL (ref 4.31–10.16)

## 2018-09-12 PROCEDURE — C1730 CATH, EP, 19 OR FEW ELECT: HCPCS | Performed by: INTERNAL MEDICINE

## 2018-09-12 PROCEDURE — 93653 COMPRE EP EVAL TX SVT: CPT | Performed by: INTERNAL MEDICINE

## 2018-09-12 PROCEDURE — 93621 COMP EP EVL L PAC&REC C SINS: CPT | Performed by: INTERNAL MEDICINE

## 2018-09-12 PROCEDURE — 93312 ECHO TRANSESOPHAGEAL: CPT | Performed by: INTERNAL MEDICINE

## 2018-09-12 PROCEDURE — 93623 PRGRMD STIMJ&PACG IV RX NFS: CPT | Performed by: INTERNAL MEDICINE

## 2018-09-12 PROCEDURE — 93010 ELECTROCARDIOGRAM REPORT: CPT | Performed by: INTERNAL MEDICINE

## 2018-09-12 PROCEDURE — C1894 INTRO/SHEATH, NON-LASER: HCPCS | Performed by: INTERNAL MEDICINE

## 2018-09-12 PROCEDURE — 93462 L HRT CATH TRNSPTL PUNCTURE: CPT | Performed by: INTERNAL MEDICINE

## 2018-09-12 PROCEDURE — 93613 INTRACARDIAC EPHYS 3D MAPG: CPT | Performed by: INTERNAL MEDICINE

## 2018-09-12 PROCEDURE — C2630 CATH, EP, COOL-TIP: HCPCS | Performed by: INTERNAL MEDICINE

## 2018-09-12 PROCEDURE — 93005 ELECTROCARDIOGRAM TRACING: CPT

## 2018-09-12 PROCEDURE — 85610 PROTHROMBIN TIME: CPT | Performed by: PHYSICIAN ASSISTANT

## 2018-09-12 PROCEDURE — 85025 COMPLETE CBC W/AUTO DIFF WBC: CPT | Performed by: PHYSICIAN ASSISTANT

## 2018-09-12 PROCEDURE — 93325 DOPPLER ECHO COLOR FLOW MAPG: CPT | Performed by: INTERNAL MEDICINE

## 2018-09-12 PROCEDURE — C1893 INTRO/SHEATH, FIXED,NON-PEEL: HCPCS | Performed by: INTERNAL MEDICINE

## 2018-09-12 PROCEDURE — 80048 BASIC METABOLIC PNL TOTAL CA: CPT | Performed by: PHYSICIAN ASSISTANT

## 2018-09-12 PROCEDURE — 93321 DOPPLER ECHO F-UP/LMTD STD: CPT | Performed by: INTERNAL MEDICINE

## 2018-09-12 PROCEDURE — 93312 ECHO TRANSESOPHAGEAL: CPT

## 2018-09-12 PROCEDURE — 93655 ICAR CATH ABLTJ DSCRT ARRHYT: CPT | Performed by: INTERNAL MEDICINE

## 2018-09-12 RX ORDER — FENTANYL CITRATE/PF 50 MCG/ML
25 SYRINGE (ML) INJECTION
Status: DISCONTINUED | OUTPATIENT
Start: 2018-09-12 | End: 2018-09-12 | Stop reason: HOSPADM

## 2018-09-12 RX ORDER — DOCUSATE SODIUM 100 MG/1
100 CAPSULE, LIQUID FILLED ORAL 2 TIMES DAILY PRN
Status: DISCONTINUED | OUTPATIENT
Start: 2018-09-12 | End: 2018-09-13 | Stop reason: HOSPADM

## 2018-09-12 RX ORDER — LANOLIN ALCOHOL/MO/W.PET/CERES
3 CREAM (GRAM) TOPICAL
Status: DISCONTINUED | OUTPATIENT
Start: 2018-09-12 | End: 2018-09-13 | Stop reason: HOSPADM

## 2018-09-12 RX ORDER — LIDOCAINE HYDROCHLORIDE 10 MG/ML
INJECTION, SOLUTION INFILTRATION; PERINEURAL CODE/TRAUMA/SEDATION MEDICATION
Status: COMPLETED | OUTPATIENT
Start: 2018-09-12 | End: 2018-09-12

## 2018-09-12 RX ORDER — PROPOFOL 10 MG/ML
INJECTION, EMULSION INTRAVENOUS AS NEEDED
Status: DISCONTINUED | OUTPATIENT
Start: 2018-09-12 | End: 2018-09-12 | Stop reason: SURG

## 2018-09-12 RX ORDER — SODIUM CHLORIDE 9 MG/ML
INJECTION, SOLUTION INTRAVENOUS CONTINUOUS PRN
Status: DISCONTINUED | OUTPATIENT
Start: 2018-09-12 | End: 2018-09-12 | Stop reason: SURG

## 2018-09-12 RX ORDER — GLYCOPYRROLATE 0.2 MG/ML
INJECTION INTRAMUSCULAR; INTRAVENOUS AS NEEDED
Status: DISCONTINUED | OUTPATIENT
Start: 2018-09-12 | End: 2018-09-12 | Stop reason: SURG

## 2018-09-12 RX ORDER — LIDOCAINE HYDROCHLORIDE 10 MG/ML
INJECTION, SOLUTION INFILTRATION; PERINEURAL AS NEEDED
Status: DISCONTINUED | OUTPATIENT
Start: 2018-09-12 | End: 2018-09-12 | Stop reason: SURG

## 2018-09-12 RX ORDER — ACETAMINOPHEN 325 MG/1
650 TABLET ORAL EVERY 4 HOURS PRN
Status: DISCONTINUED | OUTPATIENT
Start: 2018-09-12 | End: 2018-09-13 | Stop reason: HOSPADM

## 2018-09-12 RX ORDER — ADENOSINE 3 MG/ML
INJECTION INTRAVENOUS CODE/TRAUMA/SEDATION MEDICATION
Status: COMPLETED | OUTPATIENT
Start: 2018-09-12 | End: 2018-09-12

## 2018-09-12 RX ORDER — ROCURONIUM BROMIDE 10 MG/ML
INJECTION, SOLUTION INTRAVENOUS AS NEEDED
Status: DISCONTINUED | OUTPATIENT
Start: 2018-09-12 | End: 2018-09-12 | Stop reason: SURG

## 2018-09-12 RX ORDER — SODIUM CHLORIDE, SODIUM LACTATE, POTASSIUM CHLORIDE, CALCIUM CHLORIDE 600; 310; 30; 20 MG/100ML; MG/100ML; MG/100ML; MG/100ML
50 INJECTION, SOLUTION INTRAVENOUS CONTINUOUS
Status: DISCONTINUED | OUTPATIENT
Start: 2018-09-12 | End: 2018-09-13 | Stop reason: HOSPADM

## 2018-09-12 RX ORDER — METOPROLOL SUCCINATE 50 MG/1
50 TABLET, EXTENDED RELEASE ORAL DAILY
Status: DISCONTINUED | OUTPATIENT
Start: 2018-09-12 | End: 2018-09-13 | Stop reason: HOSPADM

## 2018-09-12 RX ORDER — EPHEDRINE SULFATE 50 MG/ML
INJECTION, SOLUTION INTRAVENOUS AS NEEDED
Status: DISCONTINUED | OUTPATIENT
Start: 2018-09-12 | End: 2018-09-12 | Stop reason: SURG

## 2018-09-12 RX ORDER — ONDANSETRON 2 MG/ML
INJECTION INTRAMUSCULAR; INTRAVENOUS AS NEEDED
Status: DISCONTINUED | OUTPATIENT
Start: 2018-09-12 | End: 2018-09-12 | Stop reason: SURG

## 2018-09-12 RX ORDER — HEPARIN SODIUM 1000 [USP'U]/ML
INJECTION, SOLUTION INTRAVENOUS; SUBCUTANEOUS CODE/TRAUMA/SEDATION MEDICATION
Status: COMPLETED | OUTPATIENT
Start: 2018-09-12 | End: 2018-09-12

## 2018-09-12 RX ORDER — FENTANYL CITRATE 50 UG/ML
INJECTION, SOLUTION INTRAMUSCULAR; INTRAVENOUS AS NEEDED
Status: DISCONTINUED | OUTPATIENT
Start: 2018-09-12 | End: 2018-09-12 | Stop reason: SURG

## 2018-09-12 RX ORDER — ALLOPURINOL 300 MG/1
300 TABLET ORAL DAILY
Status: DISCONTINUED | OUTPATIENT
Start: 2018-09-12 | End: 2018-09-13 | Stop reason: HOSPADM

## 2018-09-12 RX ORDER — AMLODIPINE BESYLATE 5 MG/1
5 TABLET ORAL DAILY
Status: DISCONTINUED | OUTPATIENT
Start: 2018-09-12 | End: 2018-09-13 | Stop reason: HOSPADM

## 2018-09-12 RX ADMIN — ROCURONIUM BROMIDE 5 MG: 10 INJECTION INTRAVENOUS at 14:39

## 2018-09-12 RX ADMIN — FENTANYL CITRATE 25 MCG: 50 INJECTION, SOLUTION INTRAMUSCULAR; INTRAVENOUS at 15:58

## 2018-09-12 RX ADMIN — ONDANSETRON 4 MG: 2 INJECTION INTRAMUSCULAR; INTRAVENOUS at 14:55

## 2018-09-12 RX ADMIN — AMLODIPINE BESYLATE 5 MG: 5 TABLET ORAL at 18:04

## 2018-09-12 RX ADMIN — ROCURONIUM BROMIDE 5 MG: 10 INJECTION INTRAVENOUS at 13:05

## 2018-09-12 RX ADMIN — ROCURONIUM BROMIDE 10 MG: 10 INJECTION INTRAVENOUS at 13:50

## 2018-09-12 RX ADMIN — PROPOFOL 200 MG: 10 INJECTION, EMULSION INTRAVENOUS at 10:14

## 2018-09-12 RX ADMIN — ROCURONIUM BROMIDE 50 MG: 10 INJECTION INTRAVENOUS at 10:14

## 2018-09-12 RX ADMIN — ADENOSINE 12 MG: 3 INJECTION, SOLUTION INTRAVENOUS at 12:28

## 2018-09-12 RX ADMIN — LIDOCAINE HYDROCHLORIDE 10 ML: 10 INJECTION, SOLUTION INFILTRATION; PERINEURAL at 11:06

## 2018-09-12 RX ADMIN — EPHEDRINE SULFATE 5 MG: 50 INJECTION, SOLUTION INTRAMUSCULAR; INTRAVENOUS; SUBCUTANEOUS at 11:36

## 2018-09-12 RX ADMIN — FENTANYL CITRATE 25 MCG: 50 INJECTION, SOLUTION INTRAMUSCULAR; INTRAVENOUS at 15:47

## 2018-09-12 RX ADMIN — LIDOCAINE HYDROCHLORIDE 50 MG: 10 INJECTION, SOLUTION INFILTRATION; PERINEURAL at 10:14

## 2018-09-12 RX ADMIN — HEPARIN SODIUM 10000 UNITS: 1000 INJECTION INTRAVENOUS; SUBCUTANEOUS at 11:00

## 2018-09-12 RX ADMIN — NEOSTIGMINE METHYLSULFATE 3 MG: 1 INJECTION, SOLUTION INTRAMUSCULAR; INTRAVENOUS; SUBCUTANEOUS at 14:59

## 2018-09-12 RX ADMIN — GLYCOPYRROLATE 0.6 MG: 0.2 INJECTION, SOLUTION INTRAMUSCULAR; INTRAVENOUS at 14:59

## 2018-09-12 RX ADMIN — SODIUM CHLORIDE, SODIUM LACTATE, POTASSIUM CHLORIDE, AND CALCIUM CHLORIDE 50 ML/HR: .6; .31; .03; .02 INJECTION, SOLUTION INTRAVENOUS at 15:55

## 2018-09-12 RX ADMIN — ROCURONIUM BROMIDE 10 MG: 10 INJECTION INTRAVENOUS at 10:54

## 2018-09-12 RX ADMIN — ROCURONIUM BROMIDE 5 MG: 10 INJECTION INTRAVENOUS at 14:25

## 2018-09-12 RX ADMIN — ROCURONIUM BROMIDE 10 MG: 10 INJECTION INTRAVENOUS at 12:04

## 2018-09-12 RX ADMIN — EPHEDRINE SULFATE 5 MG: 50 INJECTION, SOLUTION INTRAMUSCULAR; INTRAVENOUS; SUBCUTANEOUS at 13:05

## 2018-09-12 RX ADMIN — ACETAMINOPHEN 650 MG: 325 TABLET, FILM COATED ORAL at 20:24

## 2018-09-12 RX ADMIN — APIXABAN 5 MG: 5 TABLET, FILM COATED ORAL at 20:25

## 2018-09-12 RX ADMIN — FENTANYL CITRATE 25 MCG: 50 INJECTION, SOLUTION INTRAMUSCULAR; INTRAVENOUS at 10:50

## 2018-09-12 RX ADMIN — ALLOPURINOL 300 MG: 300 TABLET ORAL at 18:04

## 2018-09-12 RX ADMIN — ROCURONIUM BROMIDE 5 MG: 10 INJECTION INTRAVENOUS at 12:35

## 2018-09-12 RX ADMIN — SODIUM CHLORIDE: 0.9 INJECTION, SOLUTION INTRAVENOUS at 10:09

## 2018-09-12 RX ADMIN — FENTANYL CITRATE 25 MCG: 50 INJECTION, SOLUTION INTRAMUSCULAR; INTRAVENOUS at 13:05

## 2018-09-12 RX ADMIN — FENTANYL CITRATE 25 MCG: 50 INJECTION, SOLUTION INTRAMUSCULAR; INTRAVENOUS at 10:35

## 2018-09-12 RX ADMIN — ROCURONIUM BROMIDE 10 MG: 10 INJECTION INTRAVENOUS at 11:35

## 2018-09-12 NOTE — ANESTHESIA POSTPROCEDURE EVALUATION
Post-Op Assessment Note      CV Status:  Stable    Mental Status:  Awake    Hydration Status:  Stable    PONV Controlled:  Controlled    Airway Patency:  Patent    Post Op Vitals Reviewed: Yes          Staff: FLORECITA           /64 (09/12/18 1615)    Temp 97 9 °F (36 6 °C) (09/12/18 1615)    Pulse 56 (09/12/18 1615)   Resp 19 (09/12/18 1615)    SpO2 96 % (09/12/18 1615)

## 2018-09-12 NOTE — INTERVAL H&P NOTE
Please refer to Dr Sandoval Beck recent office note for full details  Briefly, this patient is an 45-year-old male with a history of atrial flutter, CAD with prior CABG with subsequent PCI, and PMR  A DEON/atrial flutter ablation was recommended, and he presents to undergo this procedure  He has been on Eliquis anticoagulation, and has been rate controlled with metoprolol  No significant changes since he was last seen, physical exam unchanged

## 2018-09-12 NOTE — ANESTHESIA PREPROCEDURE EVALUATION
Review of Systems/Medical History  Patient summary reviewed  Chart reviewed  No history of anesthetic complications     Cardiovascular  Hyperlipidemia, Hypertension , CAD , History of CABG, Dysrhythmias , atrial flutter, CHF ,    Pulmonary       GI/Hepatic  Negative GI/hepatic ROS          Negative  ROS        Endo/Other  Negative endo/other ROS      GYN  Negative gynecology ROS          Hematology  Negative hematology ROS      Musculoskeletal  Negative musculoskeletal ROS        Neurology  Negative neurology ROS      Psychology   Negative psychology ROS                   Anesthesia Plan  ASA Score- 3     Anesthesia Type- general with ASA Monitors  Additional Monitors:   Airway Plan: ETT  Plan Factors-    Induction- intravenous  Postoperative Plan-     Informed Consent- Anesthetic plan and risks discussed with patient

## 2018-09-12 NOTE — H&P (VIEW-ONLY)
Cardiology Consultation     Daryle Sheng  [id number]  1935  [facility] [address]    1  Atrial flutter, unspecified type (HCC)  POCT ECG    apixaban (ELIQUIS) 5 mg    metoprolol succinate (TOPROL-XL) 50 mg 24 hr tablet   2  Mixed hyperlipidemia     3  Essential hypertension     4   Coronary artery disease involving coronary bypass graft of native heart without angina pectoris         History of present illness    Patient has been referred to me by Dr Kwabena Bell for management of atrial flutter     Recent diagnosis of same after May, 2018  In May 2018, had a cardiac check in [place] and was told all was normal  Came back to [facility] and felt tachycardia when assessment revealed that he is in flutter  Was started on eliquis and metoprolol    He was smoker from age 13 to 40 but quit since then  He was very active - triathlon for many years, competed all [place] the [place]   Also [place]am [place]    Multiple cardiac conditions  1990s had balloon angioplasty  CABG in 1995  Then PCI to heart in 1999    The patient is not complaining of anginal like chest pain or chest pressure  There is no worsening orthopnea, paroxysmal nocturnal dyspnea  There is no leg swelling      Patient does get palpitation from time to time   There is no history of presyncope or syncope  There is no history of transient  lightheadedness  When patient has these palpitations, it is associated with exertional intolerance      Has a history of PMR diagnosed about 1 year ago  Prednisone tapered 1 mg/ month till it was discontinued  However pain and stiffness came back  On prednisone 2 mg/d now          Patient Active Problem List   Diagnosis    [facility]ymyalgia rhe[address]a ([facility] [address]    Hypertension    Hyperlipidemia    Gout    Coronary artery disease involving coronary bypass graft    Atrial flutter ([facility] )     No past medical history on file  Social History     Social History    Marital status: /Civil Union     Spouse name: N/A    Number of children: N/A    Years of education: N/A     Occupational History    Not on file       Social History Main Topics    Smoking status: Former Smoker    Smokeless tobacco: Never Used    Alcohol use Yes    Drug use: No    Sexual activity: Not on file     Other Topics Concern    Not on file     Social History Narrative    No narrative on file      Family History   Problem Relation Age of Onset    Alzheimer's disease Mother     Cancer Father      Past Surgical History:   Procedure Laterality Date    ANGIOPLASTY      CATARACT EXTRACTION Bilateral     CORONARY ARTERY BYPASS GRAFT         Current Outpatient Prescriptions:     allopurinol (ZYLOPRIM) 300 mg tablet, Take 1 tablet by mouth daily, Disp: , Rfl:     amLODIPine (NORVASC) 5 mg tablet, Take 1 tablet by mouth daily, Disp: , Rfl:     apixaban (ELIQUIS) 5 mg, Take 1 tablet (5 mg total) by mouth 2 (two) times a day, Disp: 60 tablet, Rfl: 0    Cholecalciferol (VITAMIN D3) 5000 units CAPS, Take 1 capsule by mouth daily, Disp: , Rfl:     ezetimibe-simvastatin (VYTORIN) 10-40 mg per tablet, Take 1 tablet by mouth daily, Disp: , Rfl:     metoprolol succinate (TOPROL-XL) 50 mg 24 hr tablet, Take 1 tablet (50 mg total) by mouth daily, Disp: 30 tablet, Rfl: 0    Misc Natural Products (GLUCOSAMINE CHOND COMPLEX/MSM PO), Take by mouth daily, Disp: , Rfl:     Multiple Vitamins-Minerals (MULTIVITAMIN ADULT PO), Take 1 tablet by mouth daily, Disp: , Rfl:     PredniSONE 1 MG TBEC, Take 2 tablets (2 mg total) by mouth daily (Patient taking differently: Take 1 each by mouth daily Taking calcium with prednisone ), Disp: 60 tablet, Rfl: 1    aspirin 81 MG tablet, Take 1 tablet by mouth daily, Disp: , Rfl:   No Known Allergies  Vitals:    08/15/18 1118   BP: 116/64   Pulse: 55   Resp: 16   Weight: 78 5 kg (173 lb 1 6 oz)   Height: 5' 8" (1 727 m)       Labs:  No results found for: NA, K, CL, CO2, BUN, CREATININE, GLUCOSE, CALCIUM  No results found for: CKTOTAL, CKMB, CKMBINDEX, TROPONINI  Lab Results   Component Value Date    WBC 9 32 07/25/2017    HGB 14 4 07/25/2017    HCT 42 8 07/25/2017    MCV 94 07/25/2017     07/25/2017     No results found for: CHOL, TRIG, HDL, LDLDIRECT  Imaging: No results found  Review of Systems:  Review of Systems   All other systems reviewed and are negative  as described in my history of present illness        Physical Exam:  Physical Exam   Constitutional: He is oriented to person, place, and time  He appears well-developed and well-nourished  No distress  Not in any distress at the current time   HENT:   Head: Normocephalic and atraumatic  Right Ear: External ear normal    Left Ear: External ear normal    Nose: Nose normal    Mouth/Throat: Uvula is midline and mucous membranes are normal    Posterior pharynx is crowded   Eyes: Conjunctivae, EOM and lids are normal  Pupils are equal, round, and reactive to light  Left eye exhibits no discharge  No pallor  No cyanosis  No icterus   Neck: Trachea normal and normal range of motion  Neck supple  No JVD present  Carotid bruit is not present  No thyromegaly present  No jugular lymphadenopathy   Cardiovascular: Normal rate, regular rhythm, S1 normal, S2 normal, normal heart sounds, intact distal pulses and normal pulses  PMI is not displaced  Exam reveals no gallop, no S3, no S4 and no friction rub  No murmur heard  Regular rhythm - flutter with 4:1 block    Pulmonary/Chest: Effort normal and breath sounds normal  No accessory muscle usage  No respiratory distress  He has no decreased breath sounds  He has no wheezes  He has no rhonchi  He has no rales  He exhibits no tenderness  Abdominal: Soft  Normal appearance and bowel sounds are normal  He exhibits no distension and no mass  There is no splenomegaly or hepatomegaly   There is no tenderness  Musculoskeletal: Normal range of motion  He exhibits edema  He exhibits no tenderness or deformity  Mild edema, he says it is chronic   Lymphadenopathy:     He has no cervical adenopathy  Neurological: He is alert and oriented to person, place, and time  Facial symmetry is retained  Extraocular movements are retained  Head neck tongue and palate movement are retained and symmetric   Skin: Skin is intact  No abrasion, no lesion and no rash noted  No erythema  Nails show no clubbing  Psychiatric: He has a normal mood and affect  His speech is normal and behavior is normal  Thought content normal          Discussion/Summary:    1   Atrial flutter with slow ventricular rate  The patient does have symptoms of  Palpitations at times   Shortness of breath with rapid heart rate  Exertional intolerance      We had a very detailed discussion with respect to atrial flutter    Natural history of disease  More than 90 percent of atrial flutter is from the caval tricuspid isthmus  Unless there has been open heart surgery or prior left-sided ablation it is predominantly a right atrial disease  This is a fixed abnormal electrical circuit  Ablation is the class 1 recommendation for this condition  Antiarrhythmics and rate control agents will only slow the flutter rate and not a long-term solution most of the time    Ablation of right-sided flutter carries a 96 percent chance of success  There is a 2-4 percent chance of recurrence  There is 1 percent chance of serious complication    He has CABG but this appears to be right sided flutter       Co- existence with atrial fibrillation  Sometimes atrial flutter can happen in endurance athletes  Most of the time it is associated with a high risk of atrial fibrillation  All prior records were reviewed to look for any evidence of atrial fibrillation  If indeed patient has  atrial fibrillation -antiarrhythmic needs to be started after atrial flutter ablation  Alternatively patient should undergo both flutter and fibrillation ablation        Anticoagulation  Atrial flutter presents with the same high risk of stroke as atrial fibrillation  Chads Vasc score is used to assess the same  The patient's chads Vasc score is - 5  Congestive heart failure - EF 45% per echo  Hypertension  Vascular disease  Age more than 75  He is on eliquis      Rate control agents  This can increase the degree of AV block and reduce the ventricular rate in atrial flutter  This is however a short time effort getting the patient prepped for a future ablation  Beta-blockers are preferable as they can be used in heart failure  Calcium channel blockers can be used in patients with asthma and peripheral vascular disease  Continue metoprolol succinate 50 mg/day      Rhythm control agent  Rhythm control agents acutely do not have significant role in atrial flutter  Flecainide and propafenone can convert atrial fibrillation to atrial flutter with more rapid ventricular conduction  Sotalol and dofetilide can be used with the goal to cardiovert the patient to sinus rhythm-the intent is that it was slow either the slow pathway or the fast pathway differentially so that flutter cannot be initiated  Amiodarone has been used with an intent to lower rate more than rhythm  Each of them has their side effects which needs to be followed carefully        DC cardioversion  Short term procedure only to  control hemodynamics acutely  This should never be a long-time procedure with repeated cardioversions  If patient is indeed been cardioverted should proceed with ablation fairly soon after it      Follow-up for atrial fibrillation  If patient does not feel palpitation or is relatively asymptomatic after flutter ablation  There is a chance of developing fibrillation with similar is symptomatic course until heart failure presents  Hence some form of monitoring likely LINQ needs use        Ablation  Ablation is the recommendation for same  It is an abnormal and fixed electrical circuit  Although flutter around scar is possible the highest risk if for CTI dependent flutter           Summary of my recommendation:  - continue with metoprolol for now to avoid RVR  - Plan for flutter ablation, Macro -re-entrant flutter, Suspect right sided, be prepared for left side if needed (post CABG)  NAVX, DEON before procedure  - High CHADS VAsc score, hold eliquis the day before and day of ablation              2  CAD, post CABG, post PCI  CHF, systolic, NYHA -II  LVEF -45%    He is very active and used to do Iron man triathlon  Now does 2 miles a day    On beta blockers now  On asa, vytorin    No symptoms   Need follow up with heart failure team            3 Hyperlipidemia  On vytorin  Well controlled        4   PMR  Recently off steroid  Symptoms came back  Now back on prednisone 2 mg/d

## 2018-09-12 NOTE — CASE MANAGEMENT
09/12/18 1403  Outpatient No Charge Bed Once     Transfer Service: Cardiology       Question: Admitting Physician Answer: ALL FRANKS        DEON / Cardiac EPS / A  Flutter Ablation    /68 (BP Location: Right arm)   Pulse 75   Temp 97 9 °F (36 6 °C) (Tympanic)   Resp 18   Ht 5' 9" (1 753 m)   Wt 77 1 kg (170 lb)   SpO2 95%   BMI 25 10 kg/m²     Scheduled Meds:  Current Facility-Administered Medications:  adenosine   Code/Trauma/Sedation Med Calista Fenton MD   heparin (porcine)   Code/Trauma/Sedation Med Calista Fenton MD   lidocaine   Code/Trauma/Sedation Med Calista Fenton MD   lidocaine   Code/Trauma/Sedation Med Calista Fenton MD     Facility-Administered Medications Ordered in Other Encounters:  ePHEDrine   PRN Creed Och, CRNA   fentanyl citrate (PF)  Intravenous PRN Creed Och, CRNA   lidocaine   PRN Luz Marina Honor, CRNA   phenlyephrine   PRN Creed Och, CRNA   propofol  Intravenous PRN Luz Marina Honor, CRNA   rocuronium   PRN Luz Marina Honor, CRNA   sodium chloride   Continuous PRN Luz Marina Honor, CRNA

## 2018-09-12 NOTE — ANESTHESIA PREPROCEDURE EVALUATION
Review of Systems/Medical History  Patient summary reviewed  Chart reviewed  No history of anesthetic complications     Cardiovascular  Exercise tolerance (METS): >4, Exercise comment: Able to climb two flights of stairs, golf without cardiopulmonary limitation Hyperlipidemia, Hypertension , CAD , History of CABG, No angina , No orthopnea,    Pulmonary  Smoker ex-smoker  ,        GI/Hepatic    GERD well controlled,   Comment: Confirmed NPO appropriate          Endo/Other    Comment: Polymyalgia rheumatica    GYN       Hematology  Negative hematology ROS      Musculoskeletal       Neurology  Negative neurology ROS      Psychology           Physical Exam    Airway    Mallampati score: II  TM Distance: >3 FB  Neck ROM: full     Dental   Comment: Lower implants, no loose teeth,     Cardiovascular  Rhythm: regular, No murmur,     Pulmonary  Breath sounds clear to auscultation,     Other Findings    8/3/18 TTE:  LEFT VENTRICLE:  Systolic function was mildly reduced. Ejection fraction was estimated to be 45 %.  There was severe hypokinesis of the basal-mid inferior wall(s).     RIGHT VENTRICLE:  The ventricle was mildly dilated.     MITRAL VALVE:  There was mild regurgitation.     TRICUSPID VALVE:  There was mild regurgitation.     PULMONIC VALVE:  There was mild regurgitation.    Anesthesia Plan  ASA Score- 3     Anesthesia Type- general with ASA Monitors.   Additional Monitors:   Airway Plan: ETT.        Plan Factors-. Patient did not smoke on day of surgery.    Induction- intravenous.    Postoperative Plan- Plan for postoperative opioid use. Planned trial extubation    Informed Consent- Anesthetic plan and risks discussed with patient.

## 2018-09-12 NOTE — PROCEDURES
Atrial Flutter           History and physical were reviewed  Patient was examined and history was reviewed  No change in patient's condition Since history and physical has been completed           The pre- operative diagnosis:  Atrial flutter            Postoperative diagnosis:  Atrial flutter           Procedure:  1  Radiofrequency ablation of atrial flutter  Flutter 1 -  ms - CTI dependent   Flutter 2 -  ms - intra -isthmic flutter     2  Comprehensive electrophysiologic evaluation with left atrial pacing and recording  3  3-D electro-anatomic mapping using CARTO system  4  Pharmacologic testing and adenosine  5  Right atrial scar map using Penta ray catheter                   Surgeon: Real Mock      Assistants -None      Specimens - none      Estimated blood loss- 30 ml      Findings-none      Complications- none      Anesthesia- general anesthesia, local lidocaine by myself                  Details of the procedure      Sheaths used  All access was obtained under ultrasound guidance  A  Right femoral vein- 8F upgraded to SRO for ablation, 7F for CS    B  Left femoral vein- 5F for HRA, 6F His , 5F for RV           Catheters used  1  HRA catheters - Kristin   2  His catheter - Yoanablabfeed  3  RV catheter - Kristin  4  Coronary sinus catheter - BiosMaidSafeter FJ curve  5  Ablation catheter - Irrigated Thermocool , Smart touch,  DF curve   6  Pentaray - for RA mapping           Imaging systems used  1  Fluoroscopy    Right anterior oblique views were used whenever we needed to determine between anterior and posterior  Left anterior oblique views were used whenever we needed to determine between right and left  2  Electrotomy mapping - 3D electro-anatomic mapping was done by the CARTO System  This was also used whenever catheter was moved   The His bundle was marked           Anticoagulation:  Heparin was used for anticoagulation   Given 10,000 in beginning of case              Step 1 : Radiofrequency Flutter ablation - CTI dependent flutter   Details of the ablation    The patient has been explained the multiple procedures which were planned  Proper consent has been obtained  he was brought to the electrophysiologic laboratory  Proper time out was done  General anesthesia was done and an arterial line was placed  Patient was draped in a sterile fashion          All access obtained as described above  All sheaths placed as described above  Heparin was used as described above, and once ACT was over 350 we went ahead with transseptal         Electroanatomic Mapping    Flutter cycle length-300 ms  Electro-anatomic mapping using the CARTO  system- 290 ms in the right atrium around the tricuspid annulus         Ablation  done using Thermocool irrigated , DF curve  Power variation between 25 and 35 W  Temperature between 30 and 35°  Impedance followed carefully not allowing it to jump more than 20 ohms     An ablation line was done all the way from tricuspid annulus to the inferior vena cava  The flutter was terminated by ablation   Took care that all near field electrograms were ablated from TV to IVC        Checking of the flutter line  Using the ablation catheter the line was checked  Block was confirmed by:  - Trans-isthmic conduction time 238  ms  - Bidirectional block present  - When stimulating lateral to the line His activation precedes coronary sinus activation  - differential pacing confirmed block           Checking with Adenosine  Proximal CS pacing with time to ablation catheter lateral to line being 237 ms  Adenosine 12 mg given with AV block and hypotension  No decrease in trans-isthmic time   There is no dormant conduction        Different times block was checked  When flutter terminated  Completion of TV -IVC line  End of EP study     All parameters of block across line were maintained          Flutter worksheet - tests to confirm block                 Step 2: comprehensive electrophysiologic study     VDEC and VEST done   ADEC and AEST done from HRA and proximal CS    ADEC , AEST and burst pacing ( at 400 ms) initiated another flutter with CL -430 ms              Step 3 - RFA of second flutter - intra -isthmic re-entry    Characteristics of flutter   Induced by burst atrial pacing from proximal CS at 400 ms  CL -430 ms  CS activation is proximal to distal  Entrainment from proximal CS : PPI- ms  Flutter terminated by entrainment lateral to original CTI line  Assessment of block across CTI line maintained - Trans-isthmic time, differential pacing, Bi-directional block and testing with adenosine confirmed block  ( especially differential pacing )  Very fractionated signals lateral to original CTI line       Ablation  done using Thermocool irrigated , DF curve  Power variation between 25 and 35 W  Temperature between 30 and 35°  Impedance followed carefully not allowing it to jump more than 20 ohms     An ablation line was done lateral to original CTI line , over the CT isthmus  and flutter terminated   Took care that all near field electrograms   Repeat burst pacing induced similar flutter at 430 ms      3D mapping of flutter and right atria  Detailed mapping of RA done with Pentaray catheter - voltage and activation  Large area of low voltage noted around CTI as well as inferior lateral atrial wall  Very detailed mapping done    Voltage mapping  - BURT view - color setting is 0 1 -0 3        Voltage mapping  - [place]raltarian view - color setting is 0 1 -0 3         Re-iduction and repeat ablation  Re induced by pacing at 400 ms from proximal CS or HRA  Evidence of block across CTI line demonstrated by differential pacing   Each time entrainment  from CTI lateral to original line terminated the flutter   Re-induced and terminated by pacing - suggests a re-entry circuit   Large area of low voltage over CTI suggests re-entry circuit there   Confirmed by ability to terminate the flutter each time ablating there  - going more lateral each time ( see in Guyanese)      This sequence was repeated for 4 different times  An area of 4 cm lateral to original CTI line , over the CTI was ablated   This made the flutter minimally inducible  Few beats and it would terminate        3D CARTO map of ablation points    Guyanese view tilted up      The blue and violet dots are in areas of flutter termination by ablation ( except one on free wall )  The medial most one is for termination of CTI dependent flutter -  ms  The lateral 4 points are for termination of intra- isthmic flutter -  ms      BURT view                 Completion of procedure  All sheaths were drawn, sutures applied  and hemostasis maintained   Patient was woken up from general anesthesia  he is neurologically intact                Summary of procedure :      1  Successful Caval tricuspid isthmus line done and block across the line demonstrated      2  Comprehensive electrophysiologic study done    3   Ablation of second flutter within the cavo -tricuspid isthmus - intra -isthmic re-entry        Recommendations :   - re start Eliquis tonight     - Restart beta blockers - needed for CAD, post CABG    - patient does have a sick sinus node and poor AVN function  May need PPM down the line

## 2018-09-13 VITALS
WEIGHT: 170 LBS | HEIGHT: 69 IN | SYSTOLIC BLOOD PRESSURE: 120 MMHG | RESPIRATION RATE: 16 BRPM | TEMPERATURE: 98 F | OXYGEN SATURATION: 90 % | BODY MASS INDEX: 25.18 KG/M2 | HEART RATE: 64 BPM | DIASTOLIC BLOOD PRESSURE: 84 MMHG

## 2018-09-13 LAB
ANION GAP SERPL CALCULATED.3IONS-SCNC: 6 MMOL/L (ref 4–13)
ATRIAL RATE: 59 BPM
BUN SERPL-MCNC: 11 MG/DL (ref 5–25)
CALCIUM SERPL-MCNC: 7.8 MG/DL (ref 8.3–10.1)
CHLORIDE SERPL-SCNC: 108 MMOL/L (ref 100–108)
CO2 SERPL-SCNC: 26 MMOL/L (ref 21–32)
CREAT SERPL-MCNC: 0.72 MG/DL (ref 0.6–1.3)
ERYTHROCYTE [DISTWIDTH] IN BLOOD BY AUTOMATED COUNT: 14.4 % (ref 11.6–15.1)
GFR SERPL CREATININE-BSD FRML MDRD: 87 ML/MIN/1.73SQ M
GLUCOSE SERPL-MCNC: 113 MG/DL (ref 65–140)
HCT VFR BLD AUTO: 39 % (ref 36.5–49.3)
HGB BLD-MCNC: 12.7 G/DL (ref 12–17)
MCH RBC QN AUTO: 31.8 PG (ref 26.8–34.3)
MCHC RBC AUTO-ENTMCNC: 32.6 G/DL (ref 31.4–37.4)
MCV RBC AUTO: 98 FL (ref 82–98)
P AXIS: 72 DEGREES
PLATELET # BLD AUTO: 200 THOUSANDS/UL (ref 149–390)
PMV BLD AUTO: 11.2 FL (ref 8.9–12.7)
POTASSIUM SERPL-SCNC: 3.6 MMOL/L (ref 3.5–5.3)
PR INTERVAL: 248 MS
QRS AXIS: 34 DEGREES
QRSD INTERVAL: 96 MS
QT INTERVAL: 452 MS
QTC INTERVAL: 447 MS
RBC # BLD AUTO: 3.99 MILLION/UL (ref 3.88–5.62)
SODIUM SERPL-SCNC: 140 MMOL/L (ref 136–145)
T WAVE AXIS: 28 DEGREES
VENTRICULAR RATE: 59 BPM
WBC # BLD AUTO: 7.76 THOUSAND/UL (ref 4.31–10.16)

## 2018-09-13 PROCEDURE — 85027 COMPLETE CBC AUTOMATED: CPT | Performed by: PHYSICIAN ASSISTANT

## 2018-09-13 PROCEDURE — 93010 ELECTROCARDIOGRAM REPORT: CPT | Performed by: INTERNAL MEDICINE

## 2018-09-13 PROCEDURE — 93005 ELECTROCARDIOGRAM TRACING: CPT

## 2018-09-13 PROCEDURE — 80048 BASIC METABOLIC PNL TOTAL CA: CPT | Performed by: PHYSICIAN ASSISTANT

## 2018-09-13 PROCEDURE — 99217 PR OBSERVATION CARE DISCHARGE MANAGEMENT: CPT | Performed by: PHYSICIAN ASSISTANT

## 2018-09-13 RX ORDER — POTASSIUM CHLORIDE 20 MEQ/1
40 TABLET, EXTENDED RELEASE ORAL ONCE
Status: COMPLETED | OUTPATIENT
Start: 2018-09-13 | End: 2018-09-13

## 2018-09-13 RX ADMIN — SODIUM CHLORIDE, SODIUM LACTATE, POTASSIUM CHLORIDE, AND CALCIUM CHLORIDE 50 ML/HR: .6; .31; .03; .02 INJECTION, SOLUTION INTRAVENOUS at 00:17

## 2018-09-13 RX ADMIN — POTASSIUM CHLORIDE 40 MEQ: 1500 TABLET, EXTENDED RELEASE ORAL at 09:28

## 2018-09-13 RX ADMIN — APIXABAN 5 MG: 5 TABLET, FILM COATED ORAL at 09:13

## 2018-09-13 RX ADMIN — METOPROLOL SUCCINATE 50 MG: 50 TABLET, EXTENDED RELEASE ORAL at 09:13

## 2018-09-13 NOTE — CASE MANAGEMENT
Continued Stay Review    Date: 09/13/2018  Age/Sex: 80 y o  male     Assessment/Plan: Enxertos 30    Discharge Plan:   09/13/18 1114  Discharge patient Once     Discharge Disposition: Home/Self Care    Expected Discharge Date: 09/13/18              Vital Signs: /84 (BP Location: Right arm)   Pulse 64   Temp 98 °F (36 7 °C) (Oral)   Resp 16   Ht 5' 9" (1 753 m)   Wt 77 1 kg (170 lb)   SpO2 90%   BMI 25 10 kg/m²     Medications:   Scheduled Meds:   Current Facility-Administered Medications:  acetaminophen 650 mg Oral Q4H PRN Trung Cochran, PA-MADDY    allopurinol 300 mg Oral Daily Trung Cochran, PA-MADDY    amLODIPine 5 mg Oral Daily Trung Cochran, PA-MADDY    apixaban 5 mg Oral BID Trung Cochran, PA-MADDY    docusate sodium 100 mg Oral BID PRN Zak Cochran, PA-C    lactated ringers 50 mL/hr Intravenous Continuous Ellie Brewster CRNA Last Rate: 50 mL/hr (09/13/18 0017)   melatonin 3 mg Oral HS PRN Zak Serrano Friend, PA-C    metoprolol succinate 50 mg Oral Daily Trung Cochran, PA-MADDY      Continuous Infusions:   lactated ringers 50 mL/hr Last Rate: 50 mL/hr (09/13/18 0017)     Abnormal Labs/Diagnostic Results:

## 2018-09-13 NOTE — DISCHARGE INSTRUCTIONS
Please continue Eliquis and Toprol-XL as previously instructed  No heavy lifting or strenuous activity for one week  No soaking in a bath tub/hot tub/swimming pool for one week or until groin heals  If you notice ongoing bleeding, swelling, or firm lumps in groin near ablation incision, please contact Dr Jania Frederick office - [phone]

## 2018-09-13 NOTE — DISCHARGE SUMMARY
Discharge Summary - Roseanne Ahmadi 80 y o  male MRN: [id number]    Unit/Bed#: CW2 211-02 Encounter: [id number]      Admission Date: 9/12/2018   Discharge Date: 9/13/2018    Discharge Diagnosis:   1  Typical atrial flutter status post ablation, also second ablation of intra-isthmic reentry   A ) maintained on Eliquis anticoagulation   B ) sick sinus syndrome and AV valentino disease noted during ablation, maintained on Toprol-XL   C ) frequent PACs noted in sinus rhythm  2  Mildly reduced LV systolic function with EF 45% per preop DEON, on Toprol XL  3  Coronary artery disease status post remote CABG with subsequent PCI  4  Hypertension  5  Hyperlipidemia    Procedures Performed:   Orders Placed This Encounter   Procedures    Cardiac eps/aflutter ablation   1  Transesophageal echocardiogram which showed no evidence of left atrial appendage thrombus  2  Typical cava tricuspid isthmus dependent atrial flutter ablation  3  Second ablation of intra-isthmic reentry    Consultants: none    HPI: Please refer to the initial history and physical as well as procedure notes for full details  Briefly, Roseanne Ahmadi is a 80y o  year old male with a history of hypertension, hyperlipidemia, and CAD with remote CABG  He was diagnosed with atrial flutter in May 2018 when he was experiencing tachycardia  His family physician referred him to Dr Lou Dean, who recommended atrial flutter ablation  He presented this hospital admission to undergo this procedure  Hospital Course: DARREN Holden Sridharabner presented at his baseline state of health  After the procedure was explained in detail and consent was obtained, he underwent the above listed procedures without complications  Please see Dr Forest Esparza procedure notes for full details  He tolerated the procedure well  He was then monitored overnight for further observation  There were no acute issues or events overnight    Postop EKG did show sinus rhythm with frequent PACs, and this was noted throughout his hospital stay  The following morning he denied all cardiac complaints, including chest pain/pressure, dyspnea, palpitations, dizziness, lightheadedness, or syncope  His vital signs were reviewed and labs are stable  Telemetry again showed normal sinus rhythm with frequent PACs, very brief atrial runs, and infrequent PVCs  This was reviewed with Dr Baudilio Sinclair, who recommended continuing his current regimen, and monitoring his symptoms at the time of follow-up  If he is symptomatic or if he has recurrent arrhythmias, he will likely require device implantation so that antiarrhythmics can be initiated  His groins were soft without significant hematoma or recurrent bleeding, and sutures were removed without incidence  Physical exam:  GEN: NAD, alert and oriented, well appearing  SKIN: dry without significant lesions or rashes  HEENT: NCAT, PERRL, EOMs intact  NECK: No JVD appreciated  CARDIOVASCULAR: irregular with ectopy noted, normal S1, S2 without murmurs, rubs, or gallops appreciated  LUNGS: Clear to auscultation bilaterally without wheezes, rhonchi, or rales  ABDOMEN: Soft, nontender, nondistended  EXTREMITIES/VASCULAR: perfused without clubbing, cyanosis, or edema b/l  PSYCH: Normal mood and affect  NEURO: CN ll-Xll grossly intact    He was given routine post ablation discharge instructions and restrictions, and these were explained in detail  He will follow up with me in the office in 1 month, and he was instructed to follow up with his primary cardiologist/PCP as previously instructed  In terms of his medications, no changes will be made  He will continue Toprol-XL 50 mg daily for both high blood pressure as well as frequent ectopy  He is also maintained on Norvasc, and his blood pressures were stable throughout the admission  He will continue anticoagulation with Eliquis  He is stable for discharge at this time with all questions answered   He was discussed in detail with Dr Beau Mccoy who is in agreement with this discharge summary  Discharge Medications:  See after visit summary for reconciled discharge medications provided to patient and family  Prescriptions Prior to Admission   Medication    allopurinol (ZYLOPRIM) 300 mg tablet    amLODIPine (NORVASC) 5 mg tablet    CALCIUM PO    Cholecalciferol (VITAMIN D3) 5000 units CAPS    Esomeprazole Magnesium (NEXIUM PO)    ezetimibe-simvastatin (VYTORIN) 10-40 mg per tablet    metoprolol succinate (TOPROL-XL) 50 mg 24 hr tablet    Misc Natural Products (GLUCOSAMINE CHOND COMPLEX/MSM PO)    Multiple Vitamins-Minerals (MULTIVITAMIN ADULT PO)    PredniSONE 1 MG TBEC    apixaban (ELIQUIS) 5 mg         [facility] Labs/diagnostics:  CBC with diff:   Results from last 7 days  Lab Units 09/13/18  0435 09/12/18  0841   WBC Thousand/uL 7 76 7 39   HEMOGLOBIN g/dL 12 7 14 0   HEMATOCRIT % 39 0 42 9   MCV fL 98 96   PLATELETS Thousands/uL 200 229   MCH pg 31 8 31 3   MCHC g/dL 32 6 32 6   RDW % 14 4 14 1   MPV fL 11 2 10 7   NRBC AUTO /100 WBCs  --  0       BMP:  Results from last 7 days  Lab Units 09/13/18  0435 09/12/18  0841   SODIUM mmol/L 140 138   POTASSIUM mmol/L 3 6 4 1   CHLORIDE mmol/L 108 105   CO2 mmol/L 26 26   BUN mg/dL 11 23   CREATININE mg/dL 0 72 0 77   CALCIUM mg/dL 7 8* 8 9       Magnesium:       Coags:   Results from last 7 days  Lab Units 09/12/18  0841   INR  7 72         Complications: none    Condition at Discharge: good     Discharge instructions/Information to patient and family:   See after visit summary for information provided to patient and family  Provisions for Follow-Up Care:  See after visit summary for information related to follow-up care and any pertinent home health orders  Disposition: Home    Planned Readmission: No    Discharge Statement   I spent 45 minutes minutes discharging the patient  This time was spent on the day of discharge   I had direct contact with the patient on the day of discharge  Additional documentation is required if more than 30 minutes were spent on discharge   Evaluating the incision, discussing discharge instructions and restrictions, arranging follow up appointments, discussing medications

## 2018-10-03 ENCOUNTER — OFFICE VISIT (OUTPATIENT)
Dept: INTERNAL MEDICINE CLINIC | Facility: CLINIC | Age: 83
End: 2018-10-03
Payer: MEDICARE

## 2018-10-03 ENCOUNTER — OFFICE VISIT (OUTPATIENT)
Dept: CARDIOLOGY CLINIC | Facility: CLINIC | Age: 83
End: 2018-10-03
Payer: MEDICARE

## 2018-10-03 VITALS
BODY MASS INDEX: 25.15 KG/M2 | DIASTOLIC BLOOD PRESSURE: 68 MMHG | WEIGHT: 169.8 LBS | RESPIRATION RATE: 16 BRPM | HEIGHT: 69 IN | HEART RATE: 56 BPM | SYSTOLIC BLOOD PRESSURE: 128 MMHG

## 2018-10-03 VITALS
DIASTOLIC BLOOD PRESSURE: 84 MMHG | TEMPERATURE: 98.4 F | BODY MASS INDEX: 25.61 KG/M2 | RESPIRATION RATE: 15 BRPM | SYSTOLIC BLOOD PRESSURE: 124 MMHG | WEIGHT: 169 LBS | HEART RATE: 56 BPM | HEIGHT: 68 IN

## 2018-10-03 DIAGNOSIS — I10 ESSENTIAL HYPERTENSION: ICD-10-CM

## 2018-10-03 DIAGNOSIS — I25.810 CORONARY ARTERY DISEASE INVOLVING CORONARY BYPASS GRAFT OF NATIVE HEART WITHOUT ANGINA PECTORIS: ICD-10-CM

## 2018-10-03 DIAGNOSIS — E78.2 MIXED HYPERLIPIDEMIA: ICD-10-CM

## 2018-10-03 DIAGNOSIS — Z23 NEED FOR INFLUENZA VACCINATION: ICD-10-CM

## 2018-10-03 DIAGNOSIS — I48.92 ATRIAL FLUTTER, UNSPECIFIED TYPE (HCC): ICD-10-CM

## 2018-10-03 DIAGNOSIS — M35.3 POLYMYALGIA RHEUMATICA (HCC): ICD-10-CM

## 2018-10-03 DIAGNOSIS — M1A.00X0 IDIOPATHIC CHRONIC GOUT WITHOUT TOPHUS, UNSPECIFIED SITE: ICD-10-CM

## 2018-10-03 DIAGNOSIS — I48.92 ATRIAL FLUTTER, UNSPECIFIED TYPE (HCC): Primary | ICD-10-CM

## 2018-10-03 PROCEDURE — G0008 ADMIN INFLUENZA VIRUS VAC: HCPCS | Performed by: INTERNAL MEDICINE

## 2018-10-03 PROCEDURE — 90662 IIV NO PRSV INCREASED AG IM: CPT | Performed by: INTERNAL MEDICINE

## 2018-10-03 PROCEDURE — 99214 OFFICE O/P EST MOD 30 MIN: CPT | Performed by: INTERNAL MEDICINE

## 2018-10-03 PROCEDURE — 93000 ELECTROCARDIOGRAM COMPLETE: CPT | Performed by: PHYSICIAN ASSISTANT

## 2018-10-03 PROCEDURE — 99214 OFFICE O/P EST MOD 30 MIN: CPT | Performed by: PHYSICIAN ASSISTANT

## 2018-10-03 RX ORDER — METOPROLOL SUCCINATE 50 MG/1
50 TABLET, EXTENDED RELEASE ORAL DAILY
Qty: 90 TABLET | Refills: 2 | Status: SHIPPED | OUTPATIENT
Start: 2018-10-03 | End: 2019-07-11 | Stop reason: SDUPTHER

## 2018-10-03 RX ORDER — PREDNISONE 1 MG/1
5 TABLET ORAL DAILY
COMMUNITY
End: 2019-05-30 | Stop reason: ALTCHOICE

## 2018-10-03 NOTE — PROGRESS NOTES
Cardiology Follow Up    Pato Duty  1935  [id number]  HEART & VASCULAR [facility]  [address]        Assessment/Plan     1  Atrial flutter, unspecified type (HCC)  POCT ECG    apixaban (ELIQUIS) 5 mg    metoprolol succinate (TOPROL-XL) 50 mg 24 hr tablet   2  Essential hypertension     3  Coronary artery disease involving coronary bypass graft of native heart without angina pectoris     4  Mixed hyperlipidemia         ASSESSMENT:  1  Typical atrial flutter status post ablation, also second ablation of intra-isthmic reentry             A ) maintained on Eliquis anticoagulation             B ) sick sinus syndrome and AV valentino disease noted during ablation, maintained on Toprol-XL             C ) frequent PACs noted in sinus rhythm  2  Mildly reduced LV systolic function with EF 45% per preop DEON, on Toprol XL  3  Coronary artery disease status post remote CABG with subsequent PCI  4  Hypertension  5  Hyperlipidemia  6  Polymyalgia rheumatica    DISCUSSION/SUMMARY:   1  He continues to have sinus bradycardia with PACs noted  This was seen following his ablation  He has always had a low resting heart rate, and is asymptomatic with this ectopy  He is maintained on Toprol-XL 50 mg daily  I reviewed these findings with Dr Nafisa Dooley  As the patient is asymptomatic and reports an irregular heartbeat (with which he is asymptomatic) for many years, I see no benefit in increasing medications or placing a pacemaker for his bradycardia  We will continue his current regimen, and will continue to monitor for changes in symptoms  2  He is currently maintained on Eliquis anticoagulation following his ablation  We discussed possibly placing a loop recorder so that we can discontinue Eliquis but still safely monitor for recurrent atrial fibrillation which he may be unaware of    He did not want any invasive procedures, and would rather continue his anticoagulation  This can always be an option in the future if he changes his mind  3  He has a remote history of CABG, and denies recurrent angina or its equivalents  He will continue his ezetimibe-simvastatin as well as beta-blocker  I reviewed his most recent lipid panel from 8/2018, which shows adequate control  Per Dr Becka Guerrero, he does not also need aspirin while he is taking Eliquis  4   His blood pressure is well controlled today, and he will continue amlodipine and Toprol-XL  He also has a cardiologist in [place], where he will be spending time over the winter  I will send records from his recent hospitalization  I asked him to follow up with us when he returns next spring, but to contact us in the meantime with any questions, concerns, or changes in symptoms  I refilled his Eliquis and Toprol-XL as per his request       History of Present Illness     HPI/INTERVAL HISTORY: Deanna Harrington is a 80 y o  male with a history of hypertension, hyperlipidemia, and CAD with remote CABG  He was diagnosed with atrial flutter in May 2018 when he was experiencing tachycardia  His family physician referred him to Dr Becka Guerrero, who recommended atrial flutter ablation which he underwent on 9/12/2018  In the postop setting, telemetry showed normal sinus rhythm with frequent PACs, brief atrial runs, and infrequent PVCs  Dr Becka Guerrero recommended continuing his current regimen (Toprol XL 50 mg daily), and monitoring his symptoms at the time of follow-up  He felt that if he has recurrent arrhythmias, he may require device implantation so that antiarrhythmics can be started or other medication adjustments could be made  He presents today for post ablation follow-up  He states following the procedure he had 3-4 days chest pain with deep inspiration, however that has now completely resolved    He otherwise denies chest pain currently, shortness of breath, dizziness, lightheadedness, palpitations, syncope or presyncope, weight gain, or lower extremity edema  He did have bruising in his groin following the procedure, but denies hematoma or pain  He states this is slowly improving  He reports that he has always had a slow heart rate, even as low as in the 30s, without symptoms  He was very athletic 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, and thus had a 8 low baseline heart rate  He states having rates in the 50s now is fast for him  He also reports that he has had an "irregular heartbeat my entire life " He is only aware of these when he takes his pulse, but it causes no symptoms  His current issues are more so related to his polymyalgia rheumatica and back pain  Review of Systems   Per written review of systems in paper chart, this is positive for arthritis and back pain  Otherwise ROS as noted above, all other 12 point review of systems was performed and is negative  Historical Information   Social History     Social History    Marital status: /Civil Union     Spouse name: N/A    Number of children: N/A    Years of education: N/A     Occupational History    Not on file  Social History Main Topics    Smoking status: Former Smoker    Smokeless tobacco: Never Used    Alcohol use Yes      Comment: one a day     Drug use: No    Sexual activity: Not on file     Other Topics Concern    Not on file     Social History Narrative    No narrative on file     No past medical history on file    Past Surgical History:   Procedure Laterality Date    ANGIOPLASTY      CATARACT EXTRACTION Bilateral     CORONARY ARTERY BYPASS GRAFT       History   Alcohol Use    Yes     Comment: one a day      History   Drug Use No     History   Smoking Status    Former Smoker   Smokeless Tobacco    Never Used     Family History   Problem Relation Age of Onset    Alzheimer's disease Mother     Cancer Father        Meds/Allergies       Current Outpatient Prescriptions:     allopurinol (ZYLOPRIM) 300 mg tablet, Take 1 tablet by mouth daily, Disp: , Rfl:     amLODIPine (NORVASC) 5 mg tablet, Take 1 tablet by mouth daily, Disp: , Rfl:     apixaban (ELIQUIS) 5 mg, Take 1 tablet (5 mg total) by mouth 2 (two) times a day, Disp: 180 tablet, Rfl: 2    CALCIUM PO, Take by mouth, Disp: , Rfl:     Cholecalciferol (VITAMIN D3) 5000 units CAPS, Take 1 capsule by mouth daily, Disp: , Rfl:     Esomeprazole Magnesium (NEXIUM PO), Take by mouth, Disp: , Rfl:     ezetimibe-simvastatin (VYTORIN) 10-40 mg per tablet, Take 1 tablet by mouth daily, Disp: , Rfl:     metoprolol succinate (TOPROL-XL) 50 mg 24 hr tablet, Take 1 tablet (50 mg total) by mouth daily, Disp: 90 tablet, Rfl: 2    Misc Natural Products (GLUCOSAMINE CHOND COMPLEX/MSM PO), Take by mouth daily, Disp: , Rfl:     Multiple Vitamins-Minerals (MULTIVITAMIN ADULT PO), Take 1 tablet by mouth daily, Disp: , Rfl:     predniSONE 5 mg tablet, Take 5 mg by mouth daily, Disp: , Rfl:     No Known Allergies    Objective   Vitals: Blood pressure 128/68, pulse 56, resp  rate 16, height 5' 9" (1 753 m), weight 77 kg (169 lb 12 8 oz)          Physical Exam  GEN: NAD, alert and oriented, well appearing  SKIN: dry without significant lesions or rashes  HEENT: NCAT, PERRL, EOMs intact  NECK: No JVD appreciated  CARDIOVASCULAR:  Largely regular rate and rhythm with occasional ectopy noted, normal S1, S2 without murmurs, rubs, or gallops appreciated  LUNGS: Clear to auscultation bilaterally without wheezes, rhonchi, or rales  ABDOMEN: Soft, nontender, nondistended  EXTREMITIES/VASCULAR: perfused without clubbing, cyanosis, or edema b/l  PSYCH: Normal mood and affect  NEURO: CN ll-Xll grossly intact        Labs:  Admission on 09/12/2018, Discharged on 09/13/2018   Component Date Value    Sodium 09/12/2018 138     Potassium 09/12/2018 4 1     Chloride 09/12/2018 105     CO2 09/12/2018 26     ANION GAP 09/12/2018 7     BUN 09/12/2018 23     Creatinine 09/12/2018 0 77     Glucose 09/12/2018 112     Glucose, Fasting 09/12/2018 112*    Calcium 09/12/2018 8 9     eGFR 09/12/2018 85     WBC 09/12/2018 7 39     RBC 09/12/2018 4 48     Hemoglobin 09/12/2018 14 0     Hematocrit 09/12/2018 42 9     MCV 09/12/2018 96     MCH 09/12/2018 31 3     MCHC 09/12/2018 32 6     RDW 09/12/2018 14 1     MPV 09/12/2018 10 7     Platelets 66/37/3156 229     nRBC 09/12/2018 0     Neutrophils Relative 09/12/2018 66     Immat GRANS % 09/12/2018 1     Lymphocytes Relative 09/12/2018 20     Monocytes Relative 09/12/2018 10     Eosinophils Relative 09/12/2018 2     Basophils Relative 09/12/2018 1     Neutrophils Absolute 09/12/2018 4 90     Immature Grans Absolute 09/12/2018 0 04     Lymphocytes Absolute 09/12/2018 1 51     Monocytes Absolute 09/12/2018 0 77     Eosinophils Absolute 09/12/2018 0 13     Basophils Absolute 09/12/2018 0 04     Protime 09/12/2018 13 3     INR 09/12/2018 1 00     Ventricular Rate 09/12/2018 57     Atrial Rate 09/12/2018 202     QRSD Interval 09/12/2018 104     QT Interval 09/12/2018 448     QTC Interval 09/12/2018 436     P Axis 09/12/2018 269     QRS Axis 09/12/2018 46     T Wave Axis 09/12/2018 -49     Ventricular Rate 09/12/2018 62     Atrial Rate 09/12/2018 62     QRSD Interval 09/12/2018 96     QT Interval 09/12/2018 638     QTC Interval 09/12/2018 649     QRS Axis 09/12/2018 68     T Wave Axis 09/12/2018 61     Sodium 09/13/2018 140     Potassium 09/13/2018 3 6     Chloride 09/13/2018 108     CO2 09/13/2018 26     ANION GAP 09/13/2018 6     BUN 09/13/2018 11     Creatinine 09/13/2018 0 72     Glucose 09/13/2018 113     Calcium 09/13/2018 7 8*    eGFR 09/13/2018 87     WBC 09/13/2018 7 76     RBC 09/13/2018 3 99     Hemoglobin 09/13/2018 12 7     Hematocrit 09/13/2018 39 0     MCV 09/13/2018 98     MCH 09/13/2018 31 8     MCHC 09/13/2018 32 6     RDW 09/13/2018 14 4     Platelets 00/59/9497 200     MPV 09/13/2018 11 2     Ventricular Rate 2018 59     Atrial Rate 2018 59     AR Interval 2018 248     QRSD Interval 2018 96     QT Interval 2018 452     QTC Interval 2018 447     P Axis 2018 72     QRS Axis 2018 34     T Wave [place] 2018 28      Outpatient labs 2018:      Imaging: I have personally reviewed pertinent reports  ECHO:   Results for orders placed during the hospital encounter of 18   Echo complete with contrast if indicated    Narrative [address], [address] [facility]  [phone]    Transthoracic Echocardiogram  2D, M-mode, and Color Doppler    Study date:  03-Aug-2018    Patient: Kassandra Stock  MR number: [id number]  Account number: [de-identified]  : 1935  Age: 80 years  Gender: Male  Status: Outpatient  Location: [facility]  Height: 68 in  Weight: 172 lb  BP: 130/ 84 mmHg    Indications: Atrial flutter  Diagnoses: I48 1 - Atrial flutter    Sonographer:  Stoddard RCS  Interpreting Physician:  Micah Campos MD  Primary Physician:  Francisco Javier Castellanos MD  Referring Physician:  Francisco Javier Castellanos MD  Group:  [facility]    SUMMARY    LEFT VENTRICLE:  Systolic function was mildly reduced  Ejection fraction was estimated to be 45 %  There was severe hypokinesis of the basal-mid inferior wall(s)  RIGHT VENTRICLE:  The ventricle was mildly dilated  MITRAL VALVE:  There was mild regurgitation  TRICUSPID VALVE:  There was mild regurgitation  PULMONIC VALVE:  There was mild regurgitation  SUMMARY MEASUREMENTS  Unspecified Scan Mode measurements:  Left Ventricle:   HR was 146 /min  HISTORY: PRIOR HISTORY: CAD  Hypertension  Hyperlipidemia  PROCEDURE: The study was performed in the [address] [place] [address]  This was a routine study  The transthoracic approach was used  The study included complete 2D imaging, M-mode, and color Doppler   The heart rate was 81 bpm, at the  start of the study  Images were obtained from the parasternal, apical, subcostal, and suprasternal notch acoustic windows  Image quality was good  LEFT VENTRICLE: Size was normal  Systolic function was mildly reduced  Ejection fraction was estimated to be 45 %  There was severe hypokinesis of the basal-mid inferior wall(s)  Wall thickness was normal  DOPPLER: The study was not  technically sufficient to allow evaluation of LV diastolic function  RIGHT VENTRICLE: The ventricle was mildly dilated  Systolic function was normal  Wall thickness was normal     LEFT ATRIUM: Size was normal     RIGHT ATRIUM: Size was normal     MITRAL VALVE: Valve structure was normal  There was normal leaflet separation  DOPPLER: The transmitral velocity was within the normal range  There was no evidence for stenosis  There was mild regurgitation  AORTIC VALVE: The valve was trileaflet  Leaflets exhibited normal thickness and normal cuspal separation  DOPPLER: Transaortic velocity was within the normal range  There was no evidence for stenosis  There was no regurgitation  TRICUSPID VALVE: The valve structure was normal  There was normal leaflet separation  DOPPLER: The transtricuspid velocity was within the normal range  There was no evidence for stenosis  There was mild regurgitation  PULMONIC VALVE: Leaflets exhibited normal thickness, no calcification, and normal cuspal separation  DOPPLER: The transpulmonic velocity was within the normal range  There was mild regurgitation  PERICARDIUM: There was no pericardial effusion  The pericardium was normal in appearance  AORTA: The root exhibited normal size  SYSTEMIC VEINS: IVC: The inferior vena cava was normal in size and course  Respirophasic changes were normal     SYSTEM MEASUREMENT TABLES    Apical four chamber  4 chamber Left Atrium Volume Index; Planimetry; End Systole;  Apical four chamber;: 20 12 cm2  Left Ventricular Diastolic Area; Method of Disks, Single Plane; End Diastole; Apical four chamber;: 30 11 cm2  Left Ventricular Ejection Fraction; Method of Disks, Single Plane; Apical four chamber;: 53 7 %  Left Ventricular systolic Area; Method of Disks, Single Plane; End Systole; Apical four chamber;: 18 51 cm2  Right Atrium Systolic Area; Planimetry; End Systole; Apical four chamber;: 23 66 cm2  Right Ventricular Internal Diastolic Dimension; End Diastole; Apical four chamber;: 42 mm  TAPSE: 12 3 mm    Apical two chamber  Left Ventricular Diastolic Area; Method of Disks, Single Plane; End Diastole; Apical two chamber;: 32 67 cm2  Left Ventricular Ejection Fraction; Method of Disks, Single Plane; Apical two chamber;: 50 1 %  Left Ventricular systolic Area; Method of Disks, Single Plane; End Systole; Apical two chamber;: 21 01 cm2    Unspecified Scan Mode  Aortic Root Diameter; End Systole;: 38 1 mm  Gradient Pressure, Peak; Simplified Bernoulli; Antegrade Flow; Systole;: 5 9 mm[Hg]  Gradient pressure, average; Simplified Bernoulli; Antegrade Flow; Systole;: 3 mm[Hg]  Left Atrium to Aortic Root Ratio;: 1 08  Left atrial diameter; End Diastole;: 41 1 mm  Cardiac Output; Method of Disks, Biplane; Systole;: 7 14 L/min  Cardiac Output; Teichholz; Systole;: 5 62 L/min  HR: 146 /min  Heart rate; Teichholz;: 90 /min  Interventricular Septum Diastolic Thickness; Teichholz; End Diastole;: 9 6 mm  Left Ventricle Internal End Diastolic Dimension; Teichholz;: 53 5 mm  Left Ventricle Internal Systolic Dimension; Teichholz; End Systole;: 41 4 mm  Left Ventricle Mass; Mass AVCube with Teichholz; End Diastole;: 179 g  Left Ventricle Posterior Wall Diastolic Thickness; Teichholz; End Diastole;: 8 5 mm  Left Ventricular Ejection Fraction; Method of Disks, Biplane;: 46 4 %  Left Ventricular Ejection Fraction; Teichholz;: 45 1 %  Left Ventricular End Diastolic Volume; Teichholz;: 138 3 ml  Left Ventricular End Systolic Volume;  Teichholz;: 75 9 ml  Left Ventricular Fractional Shortening;: 22 6 %  Stroke volume; Method of Disks, Biplane; Systole;: 48 6 ml  Stroke volume;  Teichholz; Systole;: 62 4 ml  Mitral Valve Area; Area by Pressure Half-Time; Systole;: 5 cm2  Mitral Valve E to A Ratio; Systole;: 1 38  Pressure half time; Diastole;: 0 04 s  Maximum Tricuspid valve regurgitation pressure gradient; Regurgitant Flow; Systole;: 28 6 mm[Hg]    [facility]    Prepared and electronically signed by    Tami Quintanilla MD  Signed 03-Aug-2018 19:32:18         CATH/STRESS TEST: No prior studies noted    EKG:  Sinus bradycardia with PACs noted, heart rate ranging from 53-56 beats per minute, prolonged WI interval of 242 milliseconds, normal axis, no acute ischemia noted

## 2018-10-04 NOTE — PROGRESS NOTES
[facility] Internal Medicine [place]      NAME: Handy Sinclair  AGE: 80 y o  SEX: male  : 1935   MRN: [id number]    DATE: 10/4/2018  TIME: 6:12 PM    Assessment and Plan   1  Atrial flutter, unspecified type [facility])  The patient is in sinus rhythm following ablation  He will be following up with Cardiology in [place] in the near future  2  Essential hypertension  Well controlled  3  Coronary artery disease involving coronary bypass graft of native heart without angina pectoris  Currently asymptomatic  On appropriate risk factor modification  4  Polymyalgia rheumatica ([facility] [address] )  The patient has had recurrent symptoms and has resumed prednisone 5 milligrams daily  He will be seeing his rheumatologist in [place] shortly  5  Mixed hyperlipidemia  On Vytorin  6  Idiopathic chronic gout without tophus, unspecified site  Asymptomatic  On allopurinol  7  Need for influenza vaccination  - influenza vaccine, 4917-5222, high-dose, PF 0 5 mL, for patients 65 yr+ (FLUZONE HIGH-DOSE)    Overall, the patient appears to be doing pretty well  He will maintain his current therapy for now  He will be leaving for [place] in the near future  He has follow-up arranged there  Return to office in:   Next spring  Chief Complaint     Chief Complaint   Patient presents with    Follow-up     3 months       History of Present Illness     The patient returned to the office for re-evaluation of hypertension, hyperlipidemia, coronary artery disease, atrial flutter, and polymyalgia rheumatica  Since his last visit he underwent successful ablation of his atrial flutter  His heart rate is much improved  He has been feeling well overall with no chest pain, shortness of breath, etc   He has had recurrent symptoms to suggest polymyalgia rheumatica  He resumed prednisone 5 milligrams daily  He will be seeing his rheumatologist in [place] in the near future  He has had no problems with his medications      The following portions of the patient's history were reviewed and updated as appropriate: allergies, current medications, past family history, past medical history, past social history, past surgical history and problem list     Review of Systems   Review of Systems   Constitutional: Negative  HENT: Negative for congestion, ear pain, postnasal drip, rhinorrhea, sore throat and trouble swallowing  Eyes: Negative for pain, discharge, redness and visual disturbance  Respiratory: Negative for cough, shortness of breath and wheezing  Cardiovascular: Negative  Gastrointestinal: Negative  Endocrine: Negative  Genitourinary: Negative for difficulty urinating, dysuria, frequency, hematuria and urgency  Musculoskeletal: Positive for arthralgias  Negative for gait problem, joint swelling and myalgias  Skin: Negative for rash  Neurological: Negative for dizziness, speech difficulty, weakness, light-headedness, numbness and headaches  Hematological: Negative  Psychiatric/Behavioral: Negative for confusion, decreased concentration, dysphoric mood and sleep disturbance  The patient is not nervous/anxious  Active Problem List     Patient Active Problem List   Diagnosis    Polymyalgia rheumatica ([facility] [address] )    Hypertension    Hyperlipidemia    Gout    Coronary artery disease involving coronary bypass graft    Atrial flutter (HCC)    Chronic systolic heart failure (HCC)       Objective   /84 (BP Location: Left arm, Patient Position: Sitting, Cuff Size: Standard)   Pulse 56 Comment: irregular  Temp 98 4 °F (36 9 °C) (Tympanic)   Resp 15   Ht 5' 8" (1 727 m)   Wt 76 7 kg (169 lb)   BMI 25 70 kg/m²     Physical Exam   Constitutional: He is oriented to person, place, and time  He appears well-developed and well-nourished  No distress  HENT:   Head: Normocephalic and atraumatic  Neck: Neck supple  No JVD present  No tracheal deviation present  No thyromegaly present     Cardiovascular: Normal rate, regular rhythm, normal heart sounds and intact distal pulses  Exam reveals no gallop and no friction rub  No murmur heard  Pulmonary/Chest: Effort normal and breath sounds normal  He has no wheezes  He has no rales  He exhibits no tenderness  Abdominal: Soft  Bowel sounds are normal  He exhibits no distension and no mass  There is no tenderness  There is no rebound  Musculoskeletal: Normal range of motion  He exhibits no edema or tenderness  Lymphadenopathy:     He has no cervical adenopathy  Neurological: He is alert and oriented to person, place, and time  Skin: Skin is warm and dry  Psychiatric: He has a normal mood and affect   His behavior is normal  Judgment and thought content normal        Pertinent Laboratory/Diagnostic Studies:  Admission on 09/12/2018, Discharged on 09/13/2018   Component Date Value Ref Range Status    Sodium 09/12/2018 138  136 - 145 mmol/L Final    Potassium 09/12/2018 4 1  3 5 - 5 3 mmol/L Final    Chloride 09/12/2018 105  100 - 108 mmol/L Final    CO2 09/12/2018 26  21 - 32 mmol/L Final    ANION GAP 09/12/2018 7  4 - 13 mmol/L Final    BUN 09/12/2018 23  5 - 25 mg/dL Final    Creatinine 09/12/2018 0 77  0 60 - 1 30 mg/dL Final    Glucose 09/12/2018 112  65 - 140 mg/dL Final    Glucose, Fasting 09/12/2018 112* 65 - 99 mg/dL Final    Calcium 09/12/2018 8 9  8 3 - 10 1 mg/dL Final    eGFR 09/12/2018 85  ml/min/1 73sq m Final    WBC 09/12/2018 7 39  4 31 - 10 16 Thousand/uL Final    RBC 09/12/2018 4 48  3 88 - 5 62 Million/uL Final    Hemoglobin 09/12/2018 14 0  12 0 - 17 0 g/dL Final    Hematocrit 09/12/2018 42 9  36 5 - 49 3 % Final    MCV 09/12/2018 96  82 - 98 fL Final    MCH 09/12/2018 31 3  26 8 - 34 3 pg Final    MCHC 09/12/2018 32 6  31 4 - 37 4 g/dL Final    RDW 09/12/2018 14 1  11 6 - 15 1 % Final    MPV 09/12/2018 10 7  8 9 - 12 7 fL Final    Platelets 21/31/8054 229  149 - 390 Thousands/uL Final    nRBC 09/12/2018 0  /100 WBCs Final    Neutrophils Relative 09/12/2018 66  43 - 75 % Final    Immat GRANS % 09/12/2018 1  0 - 2 % Final    Lymphocytes Relative 09/12/2018 20  14 - 44 % Final    Monocytes Relative 09/12/2018 10  4 - 12 % Final    Eosinophils Relative 09/12/2018 2  0 - 6 % Final    Basophils Relative 09/12/2018 1  0 - 1 % Final    Neutrophils Absolute 09/12/2018 4 90  1 85 - 7 62 Thousands/µL Final    Immature Grans Absolute 09/12/2018 0 04  0 00 - 0 20 Thousand/uL Final    Lymphocytes Absolute 09/12/2018 1 51  0 60 - 4 47 Thousands/µL Final    Monocytes Absolute 09/12/2018 0 77  0 17 - 1 22 Thousand/µL Final    Eosinophils Absolute 09/12/2018 0 13  0 00 - 0 61 Thousand/µL Final    Basophils Absolute 09/12/2018 0 04  0 00 - 0 10 Thousands/µL Final    Protime 09/12/2018 13 3  11 8 - 14 2 seconds Final    INR 09/12/2018 1 00  0 86 - 1 17 Final    Ventricular Rate 09/12/2018 57  BPM Final    Atrial Rate 09/12/2018 202  BPM Final    QRSD Interval 09/12/2018 104  ms Final    QT Interval 09/12/2018 448  ms Final    QTC Interval 09/12/2018 436  ms Final    P Axis 09/12/2018 269  degrees Final    QRS Axis 09/12/2018 46  degrees Final    T Wave Axis 09/12/2018 -49  degrees Final    Ventricular Rate 09/12/2018 62  BPM Final    Atrial Rate 09/12/2018 62  BPM Final    QRSD Interval 09/12/2018 96  ms Final    QT Interval 09/12/2018 638  ms Final    QTC Interval 09/12/2018 649  ms Final    QRS Axis 09/12/2018 68  degrees Final    T Wave [place] 09/12/2018 61  degrees Final    Sodium 09/13/2018 140  136 - 145 mmol/L Final    Potassium 09/13/2018 3 6  3 5 - 5 3 mmol/L Final    Chloride 09/13/2018 108  100 - 108 mmol/L Final    CO2 09/13/2018 26  21 - 32 mmol/L Final    ANION GAP 09/13/2018 6  4 - 13 mmol/L Final    BUN 09/13/2018 11  5 - 25 mg/dL Final    Creatinine 09/13/2018 0 72  0 60 - 1 30 mg/dL Final    Glucose 09/13/2018 113  65 - 140 mg/dL Final    Calcium 09/13/2018 7 8* 8 3 - 10 1 mg/dL Final    eGFR 09/13/2018 87  ml/min/1 73sq m Final    WBC 09/13/2018 7 76  4 31 - 10 16 Thousand/uL Final    RBC 09/13/2018 3 99  3 88 - 5 62 Million/uL Final    Hemoglobin 09/13/2018 12 7  12 0 - 17 0 g/dL Final    Hematocrit 09/13/2018 39 0  36 5 - 49 3 % Final    MCV 09/13/2018 98  82 - 98 fL Final    MCH 09/13/2018 31 8  26 8 - 34 3 pg Final    MCHC 09/13/2018 32 6  31 4 - 37 4 g/dL Final    RDW 09/13/2018 14 4  11 6 - 15 1 % Final    Platelets 49/45/0474 200  149 - 390 Thousands/uL Final    MPV 09/13/2018 11 2  8 9 - 12 7 fL Final    Ventricular Rate 09/13/2018 59  BPM Final    Atrial Rate 09/13/2018 59  BPM Final    OR Interval 09/13/2018 248  ms Final    QRSD Interval 09/13/2018 96  ms Final    QT Interval 09/13/2018 452  ms Final    QTC Interval 09/13/2018 447  ms Final    P Axis 09/13/2018 72  degrees Final    QRS Axis 09/13/2018 34  degrees Final    T Wave Axis 09/13/2018 28  degrees Final           Current Medications     Current Outpatient Prescriptions:     allopurinol (ZYLOPRIM) 300 mg tablet, Take 1 tablet by mouth daily, Disp: , Rfl:     amLODIPine (NORVASC) 5 mg tablet, Take 1 tablet by mouth daily, Disp: , Rfl:     CALCIUM PO, Take by mouth, Disp: , Rfl:     Cholecalciferol (VITAMIN D3) 5000 units CAPS, Take 1 capsule by mouth daily, Disp: , Rfl:     Esomeprazole Magnesium (NEXIUM PO), Take by mouth, Disp: , Rfl:     ezetimibe-simvastatin (VYTORIN) 10-40 mg per tablet, Take 1 tablet by mouth daily, Disp: , Rfl:     Misc Natural Products (GLUCOSAMINE CHOND COMPLEX/MSM PO), Take by mouth daily, Disp: , Rfl:     Multiple Vitamins-Minerals (MULTIVITAMIN ADULT PO), Take 1 tablet by mouth daily, Disp: , Rfl:     predniSONE 5 mg tablet, Take 5 mg by mouth daily, Disp: , Rfl:     apixaban (ELIQUIS) 5 mg, Take 1 tablet (5 mg total) by mouth 2 (two) times a day, Disp: 180 tablet, Rfl: 2    metoprolol succinate (TOPROL-XL) 50 mg 24 hr tablet, Take 1 tablet (50 mg total) by mouth daily, Disp: 90 tablet, Rfl: 2      Farhan Miguel MD

## 2019-05-30 ENCOUNTER — OFFICE VISIT (OUTPATIENT)
Dept: CARDIOLOGY CLINIC | Facility: CLINIC | Age: 84
End: 2019-05-30
Payer: MEDICARE

## 2019-05-30 VITALS
SYSTOLIC BLOOD PRESSURE: 150 MMHG | WEIGHT: 172.6 LBS | HEART RATE: 60 BPM | BODY MASS INDEX: 25.56 KG/M2 | DIASTOLIC BLOOD PRESSURE: 74 MMHG | HEIGHT: 69 IN

## 2019-05-30 DIAGNOSIS — I48.92 ATRIAL FLUTTER, UNSPECIFIED TYPE (HCC): Primary | ICD-10-CM

## 2019-05-30 PROCEDURE — 99214 OFFICE O/P EST MOD 30 MIN: CPT | Performed by: PHYSICIAN ASSISTANT

## 2019-05-30 PROCEDURE — 93000 ELECTROCARDIOGRAM COMPLETE: CPT | Performed by: PHYSICIAN ASSISTANT

## 2019-05-30 RX ORDER — METHYLPREDNISOLONE 4 MG/1
4 TABLET ORAL DAILY
COMMUNITY

## 2019-07-11 DIAGNOSIS — I48.92 ATRIAL FLUTTER, UNSPECIFIED TYPE (HCC): ICD-10-CM

## 2019-07-11 RX ORDER — METOPROLOL SUCCINATE 50 MG/1
50 TABLET, EXTENDED RELEASE ORAL DAILY
Qty: 90 TABLET | Refills: 2 | Status: SHIPPED | OUTPATIENT
Start: 2019-07-11 | End: 2020-04-03

## 2019-10-02 ENCOUNTER — OFFICE VISIT (OUTPATIENT)
Dept: CARDIOLOGY CLINIC | Facility: CLINIC | Age: 84
End: 2019-10-02
Payer: MEDICARE

## 2019-10-02 VITALS
BODY MASS INDEX: 26.22 KG/M2 | WEIGHT: 177 LBS | DIASTOLIC BLOOD PRESSURE: 80 MMHG | SYSTOLIC BLOOD PRESSURE: 154 MMHG | HEART RATE: 53 BPM | HEIGHT: 69 IN

## 2019-10-02 DIAGNOSIS — I48.92 ATRIAL FLUTTER, UNSPECIFIED TYPE (HCC): Primary | ICD-10-CM

## 2019-10-02 DIAGNOSIS — Z79.01 CURRENT USE OF LONG TERM ANTICOAGULATION: ICD-10-CM

## 2019-10-02 DIAGNOSIS — I10 ESSENTIAL HYPERTENSION: ICD-10-CM

## 2019-10-02 DIAGNOSIS — I50.22 CHRONIC SYSTOLIC HEART FAILURE (HCC): ICD-10-CM

## 2019-10-02 DIAGNOSIS — E78.2 MIXED HYPERLIPIDEMIA: ICD-10-CM

## 2019-10-02 DIAGNOSIS — M35.3 POLYMYALGIA RHEUMATICA (HCC): ICD-10-CM

## 2019-10-02 DIAGNOSIS — M1A.00X0 IDIOPATHIC CHRONIC GOUT WITHOUT TOPHUS, UNSPECIFIED SITE: ICD-10-CM

## 2019-10-02 PROCEDURE — 93000 ELECTROCARDIOGRAM COMPLETE: CPT | Performed by: INTERNAL MEDICINE

## 2019-10-02 PROCEDURE — 99214 OFFICE O/P EST MOD 30 MIN: CPT | Performed by: INTERNAL MEDICINE

## 2019-10-02 RX ORDER — SIMVASTATIN 40 MG
40 TABLET ORAL
COMMUNITY

## 2019-10-02 RX ORDER — EZETIMIBE 10 MG/1
10 TABLET ORAL DAILY
COMMUNITY

## 2019-10-02 NOTE — PROGRESS NOTES
Cardiology Follow Up    Jayshree Gutiérrez  1935  [id number]  [facility] [address]  [phone]    1  Atrial flutter, unspecified type ([address] )  POCT ECG   2  Polymyalgia rheumatica ([address] )     3  Mixed hyperlipidemia     4  Chronic systolic heart failure ([address] )     5  Idiopathic chronic gout without tophus, unspecified site     6  Essential hypertension     7  Current use of long term anticoagulation             HPI:  Jayshree Gutiérrez is a 80 y o  male who presents to the office for 6 month follow-up  Patient history of atrial flutter, hypertension, CAD, chronic systolic heart failure, and hyperlipidemia  Patient reports experiencing some fatigue which he is relating it to old age  He does not want a pacemaker implantation at this time  Patient just got back from a month long cruise trip and has not been very active lately  He states he needs to get back to his normal activity  Other wide he states he is doing well overall with no other symptoms         Previous History:   Patient has been referred to me by Dr Leonor Lipscomb for management of atrial flutter      Recent diagnosis of same after May, 2018  In May 2018, had a cardiac check in [place] and was told all was normal  Came back to [facility] and felt tachycardia when assessment revealed that he is in flutter  Was started on eliquis and metoprolol     He was smoker from age 13 to 40 but quit since then  He was very active - triathlon for many years, competed all across the [place]   Also [place]am [place]     Multiple cardiac conditions  1990s had balloon angioplasty  CABG in 1995  Then PCI to heart in 1999     The patient is not complaining of anginal like chest pain or chest pressure  There is no worsening orthopnea, paroxysmal nocturnal dyspnea  There is no leg swelling        Patient does get palpitation from time to time   There is no history of presyncope or syncope  There is no history of transient  lightheadedness  When patient has these palpitations, it is associated with exertional intolerance        Has a history of PMR diagnosed about 1 year ago  Prednisone tapered 1 mg/ month till it was discontinued  However pain and stiffness came back  On prednisone 2 mg/d now        /80 (BP Location: Left arm, Patient Position: Sitting, Cuff Size: Standard)   Pulse (!) 53   Ht 5' 9" (1 753 m)   Wt 80 3 kg (177 lb)   BMI 26 14 kg/m²       Patient Active Problem List   Diagnosis    Polymyalgia rheumatica (HCC)    Hypertension    Hyperlipidemia    Gout    Coronary artery disease involving coronary bypass graft    Atrial flutter (HCC)    Chronic systolic heart failure (HCC)    Current use of long term anticoagulation     No past medical history on file    Social History     Socioeconomic History    Marital status: /Civil Union     Spouse name: Not on file    Number of children: Not on file    Years of education: Not on file    Highest education level: Not on file   Occupational History    Not on file   Social Needs    Financial resource strain: Not on file    Food insecurity:     Worry: Not on file     Inability: Not on file    Transportation needs:     Medical: Not on file     Non-medical: Not on file   Tobacco Use    Smoking status: Former Smoker    Smokeless tobacco: Never Used   Substance and Sexual Activity    Alcohol use: Yes     Comment: one a day     Drug use: No    Sexual activity: Not on file   Lifestyle    Physical activity:     Days per week: Not on file     Minutes per session: Not on file    Stress: Not on file   Relationships    Social connections:     Talks on phone: Not on file     Gets together: Not on file     Attends [employer] service: Not on file     Active member of club or organization: Not on file     Attends meetings of clubs or organizations: Not on file     Relationship status: Not on file    Intimate partner violence:     Fear of current or ex partner: Not on file     Emotionally abused: Not on file     Physically abused: Not on file     Forced sexual activity: Not on file   Other Topics Concern    Not on file   Social History Narrative    Not on file      Family History   Problem Relation Age of Onset    Alzheimer's disease Mother     Cancer Father      Past Surgical History:   Procedure Laterality Date    ANGIOPLASTY      CATARACT EXTRACTION Bilateral     CORONARY ARTERY BYPASS GRAFT         Current Outpatient Medications:     allopurinol (ZYLOPRIM) 300 mg tablet, Take 1 tablet by mouth daily, Disp: , Rfl:     apixaban (ELIQUIS) 5 mg, Take 1 tablet (5 mg total) by mouth 2 (two) times a day, Disp: 180 tablet, Rfl: 2    Cholecalciferol (VITAMIN D3) 5000 units CAPS, Take 1 capsule by mouth daily, Disp: , Rfl:     Esomeprazole Magnesium (NEXIUM PO), Take 20 mg by mouth daily , Disp: , Rfl:     ezetimibe (ZETIA) 10 mg tablet, Take 10 mg by mouth daily, Disp: , Rfl:     methylprednisolone (MEDROL) 4 mg tablet, Take 4 mg by mouth daily 2mg alternating with 4mg daily, Disp: , Rfl:     metoprolol succinate (TOPROL-XL) 50 mg 24 hr tablet, Take 1 tablet (50 mg total) by mouth daily, Disp: 90 tablet, Rfl: 2    Misc Natural Products (GLUCOSAMINE CHOND COMPLEX/MSM PO), Take by mouth 2 (two) times a day , Disp: , Rfl:     Multiple Vitamins-Minerals (MULTIVITAMIN ADULT PO), Take 1 tablet by mouth daily, Disp: , Rfl:     simvastatin (ZOCOR) 40 mg tablet, Take 40 mg by mouth daily at bedtime, Disp: , Rfl:   No Known Allergies    Labs:  Lab Results   Component Value Date    K 3 6 09/13/2018     09/13/2018    CO2 26 09/13/2018    BUN 11 09/13/2018    CREATININE 0 72 09/13/2018    CALCIUM 7 8 (L) 09/13/2018     No results found for: CKTOTAL, CKMB, CKMBINDEX, TROPONINI  Lab Results   Component Value Date    WBC 7 76 09/13/2018    HGB 12 7 09/13/2018    HCT 39 0 09/13/2018    MCV 98 09/13/2018     09/13/2018     No results found for: CHOL, TRIG, HDL, LDLDIRECT  Imaging: No results found  Review of Systems:  Review of Systems   All other systems reviewed and are negative  As described in my history of present illness          Physical Exam:  Physical Exam   Constitutional: He is oriented to person, place, and time  He appears well-developed  No distress  Not in any distress at the current time   HENT:   Head: Normocephalic and atraumatic  Right Ear: External ear normal    Left Ear: External ear normal    Nose: Nose normal    Mouth/Throat: Uvula is midline and mucous membranes are normal    Posterior pharynx is crowded   Eyes: Pupils are equal, round, and reactive to light  Conjunctivae, EOM and lids are normal  No scleral icterus  No pallor  No cyanosis  No icterus   Neck: Trachea normal and normal range of motion  Neck supple  No JVD present  Carotid bruit is not present  No thyromegaly present  No jugular lymphadenopathy   Cardiovascular: Normal rate, regular rhythm, S1 normal, S2 normal, intact distal pulses and normal pulses  PMI is not displaced  Exam reveals no gallop, no S3, no S4 and no friction rub  No murmur heard  Intermittent ectopic beats   Pulmonary/Chest: Effort normal and breath sounds normal  No accessory muscle usage  No respiratory distress  He has no decreased breath sounds  He has no wheezes  He has no rhonchi  He has no rales  He exhibits no tenderness  Abdominal: Soft  Normal appearance and bowel sounds are normal  He exhibits no distension and no mass  There is no splenomegaly or hepatomegaly  There is no tenderness  Musculoskeletal: Normal range of motion  He exhibits no edema, tenderness or deformity  Lymphadenopathy:     He has no cervical adenopathy  Neurological: He is alert and oriented to person, place, and time     Facial symmetry is retained  Extraocular movements are retained  Head neck tongue and palate movement are retained and symmetric   Skin: Skin is intact  No abrasion, no lesion and no rash noted  No erythema  Nails show no clubbing  Psychiatric: He has a normal mood and affect  His speech is normal and behavior is normal  Thought content normal    Vitals reviewed  Discussion/Summary:  1  Sick sinus syndrome   The patient is currently in sinus rhythm, occasionally in the 40s  He was athletic all his life and reports heart rate as low as 40s  At the current time he is not complaining of any fatigue    Continue to follow closely for symptoms  If he complains of fatigue, lightheadedness, he will meet indication for a pacemaker        2  Atrial flutter with slow ventricular rate  Patient had originally common with symptomatic flutter  Patient underwent comprehensive ablation for atrial flutter- There was CTI dependent flutter, There was intra isthmic flutter  Post flutter ablation he remains in sinus rhythm  He does have sinus bradycardia from time to time      We had a detailed discussion about flutter and its associated conditions  There is an 80% chance of getting atrial fibrillation him the next 5 years  There is also a high chance of underlying sick sinus syndrome  His chads Vasc score is 5 - CHF with EF of 45, hypertension, vascular disease, age more than 76 - continue Eliquis   Continue with low-dose metoprolol succinate as EF is 45           3  CAD, post CABG, post PCI  CHF, systolic, NYHA -II  LVEF -45%     He is very active and used to do [employer] triathlon  Now does 2 miles a day     On beta blockers now  On asa, vytorin     No symptoms   Need follow up with heart failure team              4  Hyperlipidemia  On vytorin, ezetimibe  Well controlled           5  PMR  Recently off steroid  Symptoms came back  Now back on prednisone 2 mg/d      6     Long-term anticoagulation  Patient is on Eliquis          Recommendation:  Continue all current medications  Follow up in 9-12 month or sooner if symptoms develop         Scribe Attestation    I,:   James Mullen am acting as a scribe while in the presence of the attending physician :        I,:   Stephanie Maravilla MD personally performed the services described in this documentation    as scribed in my presence :

## 2019-10-02 NOTE — PATIENT INSTRUCTIONS
Recommendation:  Continue all current medications  Follow up in 9-12 months or sooner if symptoms develop

## 2019-10-02 NOTE — LETTER
October 2, 2019     Melita Nassar MD  [address]  [facility]    Patient: iHral Jones   YOB: 1935   Date of Visit: 10/2/2019       Dear Dr Cira Hardy: Thank you for referring Hiral Jones to me for evaluation  Below are my notes for this consultation  If you have questions, please do not hesitate to call me  I look forward to following your patient along with you  Sincerely,        Rafael Thompson MD        CC: Ab Mcneal MD  10/2/2019  2:40 PM  Sign at close encounter                                             Cardiology Follow Up    Hiral Jones  1935  [id number]  [address]  [place] [address]    1  Atrial flutter, unspecified type ([address] )  POCT ECG   2  Polymyalgia rheumatica ([address] )     3  Mixed hyperlipidemia     4  Chronic systolic heart failure ([address] )     5  Idiopathic chronic gout without tophus, unspecified site     6  Essential hypertension     7  Current use of long term anticoagulation             HPI:  Hiral Jones is a 80 y o  male who presents to the office for 6 month follow-up  Patient history of atrial flutter, hypertension, CAD, chronic systolic heart failure, and hyperlipidemia  Patient reports experiencing some fatigue which he is relating it to old age  He does not want a pacemaker implantation at this time  Patient just got back from a month long cruise trip and has not been very active lately  He states he needs to get back to his normal activity  Other wide he states he is doing well overall with no other symptoms         Previous History:   Patient has been referred to me by Dr Melita Nassar for management of atrial flutter      Recent diagnosis of same after May, 2018  In May 2018, had a cardiac check in [place] and was told all was normal  Came back to [facility] and felt tachycardia when assessment revealed that he is in flutter  Was started on eliquis and metoprolol     He was smoker from age 13 to 40 but quit since then  He was very active - triathlon for many years, competed all across the [place]   Also swam [place]     Multiple cardiac conditions  1990s had balloon angioplasty  CABG in 1995  Then PCI to heart in 1999     The patient is not complaining of anginal like chest pain or chest pressure  There is no worsening orthopnea, paroxysmal nocturnal dyspnea  There is no leg swelling        Patient does get palpitation from time to time   There is no history of presyncope or syncope  There is no history of transient  lightheadedness  When patient has these palpitations, it is associated with exertional intolerance        Has a history of PMR diagnosed about 1 year ago  Prednisone tapered 1 mg/ month till it was discontinued  However pain and stiffness came back  On prednisone 2 mg/d now        /80 (BP Location: Left arm, Patient Position: Sitting, Cuff Size: Standard)   Pulse (!) 53   Ht 5' 9" (1 753 m)   Wt 80 3 kg (177 lb)   BMI 26 14 kg/m²        Patient Active Problem List   Diagnosis    Polymyalgia rheumatica ([facility] )    Hypertension    Hyperlipidemia    Gout    Coronary artery disease involving coronary bypass graft    Atrial flutter (HCC)    Chronic systolic heart failure (HCC)    Current use of long term anticoagulation     No past medical history on file    Social History     Socioeconomic History    Marital status: /Civil Union     Spouse name: Not on file    Number of children: Not on file    Years of education: Not on file    Highest education level: Not on file   Occupational History    Not on file   Social Needs    Financial resource strain: Not on file    Food insecurity:     Worry: Not on file     Inability: Not on file    Transportation needs:     Medical: Not on file     Non-medical: Not on file   Tobacco Use    Smoking status: Former Smoker    Smokeless tobacco: Never Used   Substance and Sexual Activity    Alcohol use: Yes     Comment: one a day     Drug use: No    Sexual activity: Not on file   Lifestyle    Physical activity:     Days per week: Not on file     Minutes per session: Not on file    Stress: Not on file   Relationships    Social connections:     Talks on phone: Not on file     Gets together: Not on file     Attends Judaism service: Not on file     Active member of club or organization: Not on file     Attends meetings of clubs or organizations: Not on file     Relationship status: Not on file    Intimate partner violence:     Fear of current or ex partner: Not on file     Emotionally abused: Not on file     Physically abused: Not on file     Forced sexual activity: Not on file   Other Topics Concern    Not on file   Social History Narrative    Not on file      Family History   Problem Relation Age of Onset    Alzheimer's disease Mother     Cancer Father      Past Surgical History:   Procedure Laterality Date    ANGIOPLASTY      CATARACT EXTRACTION Bilateral     CORONARY ARTERY BYPASS GRAFT         Current Outpatient Medications:     allopurinol (ZYLOPRIM) 300 mg tablet, Take 1 tablet by mouth daily, Disp: , Rfl:     apixaban (ELIQUIS) 5 mg, Take 1 tablet (5 mg total) by mouth 2 (two) times a day, Disp: 180 tablet, Rfl: 2    Cholecalciferol (VITAMIN D3) 5000 units CAPS, Take 1 capsule by mouth daily, Disp: , Rfl:     Esomeprazole Magnesium (NEXIUM PO), Take 20 mg by mouth daily , Disp: , Rfl:     ezetimibe (ZETIA) 10 mg tablet, Take 10 mg by mouth daily, Disp: , Rfl:     methylprednisolone (MEDROL) 4 mg tablet, Take 4 mg by mouth daily 2mg alternating with 4mg daily, Disp: , Rfl:     metoprolol succinate (TOPROL-XL) 50 mg 24 hr tablet, Take 1 tablet (50 mg total) by mouth daily, Disp: 90 tablet, Rfl: 2    Misc Natural Products (GLUCOSAMINE CHOND COMPLEX/MSM PO), Take by mouth 2 (two) times a day , Disp: , Rfl:     Multiple Vitamins-Minerals (MULTIVITAMIN ADULT PO), Take 1 tablet by mouth daily, Disp: , Rfl:     simvastatin (ZOCOR) 40 mg tablet, Take 40 mg by mouth daily at bedtime, Disp: , Rfl:   No Known Allergies    Labs:  Lab Results   Component Value Date    K 3 6 09/13/2018     09/13/2018    CO2 26 09/13/2018    BUN 11 09/13/2018    CREATININE 0 72 09/13/2018    CALCIUM 7 8 (L) 09/13/2018     No results found for: CKTOTAL, CKMB, CKMBINDEX, TROPONINI  Lab Results   Component Value Date    WBC 7 76 09/13/2018    HGB 12 7 09/13/2018    HCT 39 0 09/13/2018    MCV 98 09/13/2018     09/13/2018     No results found for: CHOL, TRIG, HDL, LDLDIRECT  Imaging: No results found  Review of Systems:  Review of Systems   All other systems reviewed and are negative  As described in my history of present illness          Physical Exam:  Physical Exam   Constitutional: He is oriented to person, place, and time  He appears well-developed  No distress  Not in any distress at the current time   HENT:   Head: Normocephalic and atraumatic  Right Ear: External ear normal    Left Ear: External ear normal    Nose: Nose normal    Mouth/Throat: Uvula is midline and mucous membranes are normal    Posterior pharynx is crowded   Eyes: Pupils are equal, round, and reactive to light  Conjunctivae, EOM and lids are normal  No scleral icterus  No pallor  No cyanosis  No icterus   Neck: Trachea normal and normal range of motion  Neck supple  No JVD present  Carotid bruit is not present  No thyromegaly present  No jugular lymphadenopathy   Cardiovascular: Normal rate, regular rhythm, S1 normal, S2 normal, intact distal pulses and normal pulses  PMI is not displaced  Exam reveals no gallop, no S3, no S4 and no friction rub  No murmur heard  Intermittent ectopic beats   Pulmonary/Chest: Effort normal and breath sounds normal  No accessory muscle usage  No respiratory distress  He has no decreased breath sounds  He has no wheezes   He has no rhonchi  He has no rales  He exhibits no tenderness  Abdominal: Soft  Normal appearance and bowel sounds are normal  He exhibits no distension and no mass  There is no splenomegaly or hepatomegaly  There is no tenderness  Musculoskeletal: Normal range of motion  He exhibits no edema, tenderness or deformity  Lymphadenopathy:     He has no cervical adenopathy  Neurological: He is alert and oriented to person, place, and time  Facial symmetry is retained  Extraocular movements are retained  Head neck tongue and palate movement are retained and symmetric   Skin: Skin is intact  No abrasion, no lesion and no rash noted  No erythema  Nails show no clubbing  Psychiatric: He has a normal mood and affect  His speech is normal and behavior is normal  Thought content normal    Vitals reviewed  Discussion/Summary:  1  Sick sinus syndrome   The patient is currently in sinus rhythm, occasionally in the 40s  He was athletic all his life and reports heart rate as low as 40s  At the current time he is not complaining of any fatigue    Continue to follow closely for symptoms  If he complains of fatigue, lightheadedness, he will meet indication for a pacemaker        2  Atrial flutter with slow ventricular rate  Patient had originally common with symptomatic flutter  Patient underwent comprehensive ablation for atrial flutter- There was CTI dependent flutter, There was intra isthmic flutter  Post flutter ablation he remains in sinus rhythm  He does have sinus bradycardia from time to time      We had a detailed discussion about flutter and its associated conditions  There is an 80% chance of getting atrial fibrillation him the next 5 years  There is also a high chance of underlying sick sinus syndrome  His chads Vasc score is 5 - CHF with EF of 45, hypertension, vascular disease, age more than 76 - continue Eliquis   Continue with low-dose metoprolol succinate as EF is 45           3   CAD, post CABG, post PCI  CHF, systolic, NYHA -II  LVEF -45%     He is very active and used to do [employer] triathlon  Now does 2 miles a day     On beta blockers now  On asa, vytorin     No symptoms   Need follow up with heart failure team              4  Hyperlipidemia  On vytorin, ezetimibe  Well controlled           5  PMR  Recently off steroid  Symptoms came back  Now back on prednisone 2 mg/d      6     Long-term anticoagulation  Patient is on Eliquis          Recommendation:  Continue all current medications  Follow up in 9-12 month or sooner if symptoms develop         Scribe Attestation    I,:   Nohemi Markham am acting as a scribe while in the presence of the attending physician :        I,:   David Fierro MD personally performed the services described in this documentation    as scribed in my presence :

## 2020-04-03 DIAGNOSIS — I48.92 ATRIAL FLUTTER, UNSPECIFIED TYPE (HCC): ICD-10-CM

## 2020-04-03 RX ORDER — METOPROLOL SUCCINATE 50 MG/1
TABLET, EXTENDED RELEASE ORAL
Qty: 90 TABLET | Refills: 1 | Status: SHIPPED | OUTPATIENT
Start: 2020-04-03 | End: 2020-10-02

## 2020-04-09 DIAGNOSIS — I48.92 ATRIAL FLUTTER, UNSPECIFIED TYPE (HCC): ICD-10-CM

## 2020-04-09 RX ORDER — APIXABAN 5 MG/1
TABLET, FILM COATED ORAL
Qty: 180 TABLET | Refills: 1 | Status: SHIPPED | OUTPATIENT
Start: 2020-04-09 | End: 2020-10-02

## 2020-07-14 ENCOUNTER — OFFICE VISIT (OUTPATIENT)
Dept: INTERNAL MEDICINE CLINIC | Facility: CLINIC | Age: 85
End: 2020-07-14
Payer: MEDICARE

## 2020-07-14 VITALS
TEMPERATURE: 98.7 F | BODY MASS INDEX: 25.18 KG/M2 | WEIGHT: 170 LBS | OXYGEN SATURATION: 96 % | SYSTOLIC BLOOD PRESSURE: 160 MMHG | DIASTOLIC BLOOD PRESSURE: 72 MMHG | HEART RATE: 48 BPM | HEIGHT: 69 IN

## 2020-07-14 DIAGNOSIS — I25.810 CORONARY ARTERY DISEASE INVOLVING CORONARY BYPASS GRAFT OF NATIVE HEART WITHOUT ANGINA PECTORIS: ICD-10-CM

## 2020-07-14 DIAGNOSIS — M1A.00X0 IDIOPATHIC CHRONIC GOUT WITHOUT TOPHUS, UNSPECIFIED SITE: ICD-10-CM

## 2020-07-14 DIAGNOSIS — E78.2 MIXED HYPERLIPIDEMIA: ICD-10-CM

## 2020-07-14 DIAGNOSIS — M35.3 POLYMYALGIA RHEUMATICA (HCC): ICD-10-CM

## 2020-07-14 DIAGNOSIS — I48.92 ATRIAL FLUTTER, UNSPECIFIED TYPE (HCC): ICD-10-CM

## 2020-07-14 DIAGNOSIS — I50.22 CHRONIC SYSTOLIC HEART FAILURE (HCC): ICD-10-CM

## 2020-07-14 DIAGNOSIS — I10 ESSENTIAL HYPERTENSION: Primary | ICD-10-CM

## 2020-07-14 PROCEDURE — 3078F DIAST BP <80 MM HG: CPT | Performed by: INTERNAL MEDICINE

## 2020-07-14 PROCEDURE — 4040F PNEUMOC VAC/ADMIN/RCVD: CPT | Performed by: INTERNAL MEDICINE

## 2020-07-14 PROCEDURE — 1160F RVW MEDS BY RX/DR IN RCRD: CPT | Performed by: INTERNAL MEDICINE

## 2020-07-14 PROCEDURE — 1036F TOBACCO NON-USER: CPT | Performed by: INTERNAL MEDICINE

## 2020-07-14 PROCEDURE — 3077F SYST BP >= 140 MM HG: CPT | Performed by: INTERNAL MEDICINE

## 2020-07-14 PROCEDURE — 99214 OFFICE O/P EST MOD 30 MIN: CPT | Performed by: INTERNAL MEDICINE

## 2020-07-15 NOTE — PROGRESS NOTES
[facility] [place]      NAME: Ester Murray  AGE: 80 y o  SEX: male  : 1935   MRN: [id number]    DATE: 7/15/2020  TIME: 6:05 PM    Assessment and Plan   1  Essential hypertension  Well controlled  - CBC  - Comprehensive metabolic panel    2  Coronary artery disease involving coronary bypass graft of native heart without angina pectoris  No angina  On Eliquis  On appropriate risk factor modification  3  Atrial flutter, unspecified type ([facility] )  On Eliquis and beta-blockers  The patient sees a cardiologist in [place]  4  Chronic systolic heart failure ([facility] )  Well compensated  5  Polymyalgia rheumatica (HCC)  Stable on low-dose prednisone  6  Mixed hyperlipidemia  On simvastatin and Zetia  Check lipid profile  - Lipid panel    7  Idiopathic chronic gout without tophus, unspecified site  Currently asymptomatic   - Uric acid    The patient appears to be doing well  He has had some studies in [place] which we do not have access to at the moment  He does plan to return to [place] in the fall and will follow up with Cardiology when he is there  I advised him to get a flu shot this fall  He will continue his current medications for now  Return to office in:  1 year    Chief Complaint     Chief Complaint   Patient presents with    Follow-up     6 month    Dizziness       History of Present Illness     The patient returned for follow-up of hypertension, coronary artery disease, atrial flutter, polymyalgia rheumatica, and chronic systolic congestive heart failure  Since his last visit he has been feeling rather well  He has had no chest pain, shortness of breath, palpitations, or dizziness  He has had no PND, orthopnea, or edema  He is tolerating his medications well  He is using prednisone for polymyalgia rheumatica at a dose of 2 or 4 mg daily  He is followed by rheumatologist in [place]      The following portions of the patient's history were reviewed and updated as appropriate: allergies, current medications, past family history, past medical history, past social history, past surgical history and problem list     Review of Systems   Review of Systems   Constitutional: Negative  HENT: Negative for congestion, ear pain, postnasal drip, rhinorrhea, sore throat and trouble swallowing  Eyes: Negative for pain, discharge, redness and visual disturbance  Respiratory: Negative for cough, shortness of breath and wheezing  Cardiovascular: Negative  Gastrointestinal: Negative  Endocrine: Negative  Genitourinary: Negative for difficulty urinating, dysuria, frequency, hematuria and urgency  Musculoskeletal: Negative for arthralgias, gait problem, joint swelling and myalgias  Skin: Negative for rash  Neurological: Negative for dizziness, speech difficulty, weakness, light-headedness, numbness and headaches  Hematological: Negative  Psychiatric/Behavioral: Negative for confusion, decreased concentration, dysphoric mood and sleep disturbance  The patient is not nervous/anxious  Active Problem List     Patient Active Problem List   Diagnosis    Polymyalgia rheumatica ([facility] )    Hypertension    Hyperlipidemia    Gout    Coronary artery disease involving coronary bypass graft    Atrial flutter (HCC)    Chronic systolic heart failure (HCC)    Current use of long term anticoagulation       Objective   /72   Pulse (!) 48   Temp 98 7 °F (37 1 °C) (Tympanic)   Ht 5' 9" (1 753 m)   Wt 77 1 kg (170 lb)   SpO2 96%   BMI 25 10 kg/m²     Physical Exam   Constitutional: He is oriented to person, place, and time  He appears well-developed and well-nourished  No distress  HENT:   Head: Normocephalic and atraumatic  Neck: Neck supple  No JVD present  No tracheal deviation present  No thyromegaly present  Cardiovascular: Normal rate, regular rhythm, normal heart sounds and intact distal pulses  Exam reveals no gallop and no friction rub  No murmur heard  Pulmonary/Chest: Effort normal and breath sounds normal  He has no wheezes  He has no rales  He exhibits no tenderness  Abdominal: Soft  Bowel sounds are normal  He exhibits no distension and no mass  There is no tenderness  There is no rebound  Musculoskeletal: Normal range of motion  He exhibits no edema or tenderness  Lymphadenopathy:     He has no cervical adenopathy  Neurological: He is alert and oriented to person, place, and time  Skin: Skin is warm and dry  Psychiatric: He has a normal mood and affect  His behavior is normal  Judgment and thought content normal        Pertinent Laboratory/Diagnostic Studies:  No visits with results within 3 Month(s) from this visit     Latest known visit with results is:   Admission on 09/12/2018, Discharged on 09/13/2018   Component Date Value Ref Range Status    Sodium 09/12/2018 138  136 - 145 mmol/L Final    Potassium 09/12/2018 4 1  3 5 - 5 3 mmol/L Final    Chloride 09/12/2018 105  100 - 108 mmol/L Final    CO2 09/12/2018 26  21 - 32 mmol/L Final    ANION GAP 09/12/2018 7  4 - 13 mmol/L Final    BUN 09/12/2018 23  5 - 25 mg/dL Final    Creatinine 09/12/2018 0 77  0 60 - 1 30 mg/dL Final    Glucose 09/12/2018 112  65 - 140 mg/dL Final    Glucose, Fasting 09/12/2018 112* 65 - 99 mg/dL Final    Calcium 09/12/2018 8 9  8 3 - 10 1 mg/dL Final    eGFR 09/12/2018 85  ml/min/1 73sq m Final    WBC 09/12/2018 7 39  4 31 - 10 16 Thousand/uL Final    RBC 09/12/2018 4 48  3 88 - 5 62 Million/uL Final    Hemoglobin 09/12/2018 14 0  12 0 - 17 0 g/dL Final    Hematocrit 09/12/2018 42 9  36 5 - 49 3 % Final    MCV 09/12/2018 96  82 - 98 fL Final    MCH 09/12/2018 31 3  26 8 - 34 3 pg Final    MCHC 09/12/2018 32 6  31 4 - 37 4 g/dL Final    RDW 09/12/2018 14 1  11 6 - 15 1 % Final    MPV 09/12/2018 10 7  8 9 - 12 7 fL Final    Platelets 84/62/7583 229  149 - 390 Thousands/uL Final    nRBC 09/12/2018 0  /100 WBCs Final    Neutrophils Relative 09/12/2018 66  43 - 75 % Final    Immat GRANS % 09/12/2018 1  0 - 2 % Final    Lymphocytes Relative 09/12/2018 20  14 - 44 % Final    Monocytes Relative 09/12/2018 10  4 - 12 % Final    Eosinophils Relative 09/12/2018 2  0 - 6 % Final    Basophils Relative 09/12/2018 1  0 - 1 % Final    Neutrophils Absolute 09/12/2018 4 90  1 85 - 7 62 Thousands/µL Final    Immature Grans Absolute 09/12/2018 0 04  0 00 - 0 20 Thousand/uL Final    Lymphocytes Absolute 09/12/2018 1 51  0 60 - 4 47 Thousands/µL Final    Monocytes Absolute 09/12/2018 0 77  0 17 - 1 22 Thousand/µL Final    Eosinophils Absolute 09/12/2018 0 13  0 00 - 0 61 Thousand/µL Final    Basophils Absolute 09/12/2018 0 04  0 00 - 0 10 Thousands/µL Final    Protime 09/12/2018 13 3  11 8 - 14 2 seconds Final    INR 09/12/2018 1 00  0 86 - 1 17 Final    Ventricular Rate 09/12/2018 57  BPM Final    Atrial Rate 09/12/2018 202  BPM Final    QRSD Interval 09/12/2018 104  ms Final    QT Interval 09/12/2018 448  ms Final    QTC Interval 09/12/2018 436  ms Final    P Axis 09/12/2018 269  degrees Final    QRS Axis 09/12/2018 46  degrees Final    T Wave Axis 09/12/2018 -49  degrees Final    Ventricular Rate 09/12/2018 62  BPM Final    Atrial Rate 09/12/2018 62  BPM Final    QRSD Interval 09/12/2018 96  ms Final    QT Interval 09/12/2018 638  ms Final    QTC Interval 09/12/2018 649  ms Final    QRS Axis 09/12/2018 68  degrees Final    T Wave [place] 09/12/2018 61  degrees Final    Sodium 09/13/2018 140  136 - 145 mmol/L Final    Potassium 09/13/2018 3 6  3 5 - 5 3 mmol/L Final    Chloride 09/13/2018 108  100 - 108 mmol/L Final    CO2 09/13/2018 26  21 - 32 mmol/L Final    ANION GAP 09/13/2018 6  4 - 13 mmol/L Final    BUN 09/13/2018 11  5 - 25 mg/dL Final    Creatinine 09/13/2018 0 72  0 60 - 1 30 mg/dL Final    Glucose 09/13/2018 113  65 - 140 mg/dL Final    Calcium 09/13/2018 7 8* 8 3 - 10 1 mg/dL Final    eGFR 09/13/2018 87 ml/min/1 73sq m Final    WBC 09/13/2018 7 76  4 31 - 10 16 Thousand/uL Final    RBC 09/13/2018 3 99  3 88 - 5 62 Million/uL Final    Hemoglobin 09/13/2018 12 7  12 0 - 17 0 g/dL Final    Hematocrit 09/13/2018 39 0  36 5 - 49 3 % Final    MCV 09/13/2018 98  82 - 98 fL Final    MCH 09/13/2018 31 8  26 8 - 34 3 pg Final    MCHC 09/13/2018 32 6  31 4 - 37 4 g/dL Final    RDW 09/13/2018 14 4  11 6 - 15 1 % Final    Platelets 12/58/2319 200  149 - 390 Thousands/uL Final    MPV 09/13/2018 11 2  8 9 - 12 7 fL Final    Ventricular Rate 09/13/2018 59  BPM Final    Atrial Rate 09/13/2018 59  BPM Final    NE Interval 09/13/2018 248  ms Final    QRSD Interval 09/13/2018 96  ms Final    QT Interval 09/13/2018 452  ms Final    QTC Interval 09/13/2018 447  ms Final    P Axis 09/13/2018 72  degrees Final    QRS Axis 09/13/2018 34  degrees Final    T Wave Axis 09/13/2018 28  degrees Final           Current Medications     Current Outpatient Medications:     allopurinol (ZYLOPRIM) 300 mg tablet, Take 1 tablet by mouth daily, Disp: , Rfl:     Cholecalciferol (VITAMIN D3) 5000 units CAPS, Take 1 capsule by mouth daily, Disp: , Rfl:     ELIQUIS 5 MG, TAKE 1 TABLET BY MOUTH TWICE A DAY, Disp: 180 tablet, Rfl: 1    Esomeprazole Magnesium (NEXIUM PO), Take 20 mg by mouth daily , Disp: , Rfl:     ezetimibe (ZETIA) 10 mg tablet, Take 10 mg by mouth daily, Disp: , Rfl:     methylprednisolone (MEDROL) 4 mg tablet, Take 4 mg by mouth daily 2mg alternating with 4mg daily, Disp: , Rfl:     metoprolol succinate (TOPROL-XL) 50 mg 24 hr tablet, TAKE 1 TABLET BY MOUTH EVERY DAY, Disp: 90 tablet, Rfl: 1    Misc Natural Products (GLUCOSAMINE CHOND COMPLEX/MSM PO), Take by mouth 2 (two) times a day , Disp: , Rfl:     Multiple Vitamins-Minerals (MULTIVITAMIN ADULT PO), Take 1 tablet by mouth daily, Disp: , Rfl:     simvastatin (ZOCOR) 40 mg tablet, Take 40 mg by mouth daily at bedtime, Disp: , Rfl:       Celestine Mares, MD

## 2020-10-02 DIAGNOSIS — I48.92 ATRIAL FLUTTER, UNSPECIFIED TYPE (HCC): ICD-10-CM

## 2020-10-02 RX ORDER — APIXABAN 5 MG/1
TABLET, FILM COATED ORAL
Qty: 180 TABLET | Refills: 0 | Status: SHIPPED | OUTPATIENT
Start: 2020-10-02

## 2020-10-02 RX ORDER — METOPROLOL SUCCINATE 50 MG/1
TABLET, EXTENDED RELEASE ORAL
Qty: 90 TABLET | Refills: 0 | Status: SHIPPED | OUTPATIENT
Start: 2020-10-02 | End: 2020-10-24

## 2020-10-24 DIAGNOSIS — I48.92 ATRIAL FLUTTER, UNSPECIFIED TYPE (HCC): ICD-10-CM

## 2020-10-24 RX ORDER — METOPROLOL SUCCINATE 50 MG/1
TABLET, EXTENDED RELEASE ORAL
Qty: 90 TABLET | Refills: 0 | Status: SHIPPED | OUTPATIENT
Start: 2020-10-24 | End: 2021-01-17

## 2021-01-16 DIAGNOSIS — I48.92 ATRIAL FLUTTER, UNSPECIFIED TYPE (HCC): ICD-10-CM

## 2021-01-17 RX ORDER — METOPROLOL SUCCINATE 50 MG/1
TABLET, EXTENDED RELEASE ORAL
Qty: 90 TABLET | Refills: 0 | Status: SHIPPED | OUTPATIENT
Start: 2021-01-17 | End: 2021-06-21

## 2021-02-12 DIAGNOSIS — Z23 ENCOUNTER FOR IMMUNIZATION: ICD-10-CM

## 2021-06-21 DIAGNOSIS — I48.92 ATRIAL FLUTTER, UNSPECIFIED TYPE (HCC): ICD-10-CM

## 2021-06-21 RX ORDER — METOPROLOL SUCCINATE 50 MG/1
TABLET, EXTENDED RELEASE ORAL
Qty: 90 TABLET | Refills: 0 | Status: SHIPPED | OUTPATIENT
Start: 2021-06-21

## 2022-09-15 NOTE — ASSESSMENT & PLAN NOTE
Maintained on chronic methylprednisolone, 2 mg daily  -follows with rheumatologist in [place]  He states he has been on steroids for about 3 years  He does not believe he had DEXA scan previously    I advised him to discuss with his rheumatologist as this would be warranted to evaluate for bone density loss given chronic steroid use

## 2022-09-15 NOTE — ASSESSMENT & PLAN NOTE
Diagnosed with atrial flutter around 2018, status post ablation  On Toprol-XL for rate control, Eliquis for stroke prevention

## 2022-09-15 NOTE — PROGRESS NOTES
INTERNAL MEDICINE OFFICE VISIT  [facility]- [place]    NAME: DARREN Bernal  AGE: 80 y o  SEX: male    DATE OF ENCOUNTER: 9/16/2022    Assessment and Plan/History of Present Illness       1  Primary hypertension  Assessment & Plan:  Well controlled      2  Coronary artery disease involving coronary bypass graft of native heart without angina pectoris  Assessment & Plan:  -Status post balloon angioplasty in the 1990s, CABG in [address],[facility], had PCI in 1999 per chart review  -not on aspirin as patient is on Eliquis  -on beta-blocker  -on simvastatin, Zetia  -goal LDL less than 70  -goal BP less than 130/80      3  Atrial flutter, unspecified type [facility])  Assessment & Plan:  Diagnosed with atrial flutter around 2018, status post ablation  On Toprol-XL for rate control, Eliquis for stroke prevention      4  Chronic systolic heart failure (HCC)  Assessment & Plan:  -Most recent echo available for review in 2018 - EF 45%  -on beta-blocker, not on afterload reducer   -follows with cardiologist in [place]  He states he is having follow-up echocardiogram done relatively soon          5  Polymyalgia rheumatica (HCC)  Assessment & Plan:  Maintained on chronic methylprednisolone, 2 mg daily  -follows with rheumatologist in [place]  He states he has been on steroids for about 3 years  He does not believe he had DEXA scan previously  I advised him to discuss with his rheumatologist as this would be warranted to evaluate for bone density loss given chronic steroid use      6  Mixed hyperlipidemia  Assessment & Plan: On simvastatin and Zetia  Due for repeat lipid panel, patient states he gets labs done in [place]      7  Idiopathic chronic gout without tophus, unspecified site  Assessment & Plan: On allopurinol, no recent flares reported      8  Encounter for immunization  -     Pneumococcal Conjugate Vaccine 20-valent (Pcv20)    9   Medicare annual wellness visit, subsequent          Here today for follow-up/annual wellness visit  Medical history of gout, hyperlipidemia, PMR, systolic CHF, atrial flutter, CAD status post CABG    He is a former   He and his wife split time between the [facility] [place] and [place]  No significant concerns today       BMI Counseling: Body mass index is 25 1 kg/m²  The BMI is above normal  Nutrition recommendations include decreasing portion sizes, encouraging healthy choices of fruits and vegetables, moderation in carbohydrate intake and increasing intake of lean protein  Exercise recommendations include moderate physical activity 150 minutes/week  Rationale for BMI follow-up plan is due to patient being overweight or obese  Depression Screening and Follow-up Plan: Patient was screened for depression during today's encounter  They screened negative with a PHQ-2 score of 0  Orders Placed This Encounter   Procedures    Pneumococcal Conjugate Vaccine 20-valent (Pcv20)         Chief Complaint     Chief Complaint   Patient presents with    Medicare Wellness Visit    Follow-up       Review of Systems     10 point ROS negative except per HPI    The following portions of the patient's history were reviewed and updated as appropriate: allergies, current medications, past family history, past medical history, past social history, past surgical history and problem list     Active Problem List     Patient Active Problem List   Diagnosis    Polymyalgia rheumatica ([facility] )    Hypertension    Hyperlipidemia    Gout    Coronary artery disease involving coronary bypass graft    Atrial flutter ([facility] )    Chronic systolic heart failure (HCC)    Current use of long term anticoagulation       Objective     /64 (BP Location: Left arm, Patient Position: Sitting, Cuff Size: Standard)   Pulse (!) 50   Temp (!) 96 5 °F (35 8 °C)   Resp 18   Ht 5' 9" (1 753 m)   Wt 77 1 kg (170 lb)   SpO2 96%   BMI 25 10 kg/m²     Physical Exam  Constitutional:       Appearance: Normal appearance   He is not ill-appearing  HENT:      Head: Normocephalic and atraumatic  Eyes:      General: No scleral icterus  Right eye: No discharge  Left eye: No discharge  Cardiovascular:      Rate and Rhythm: Normal rate and regular rhythm  Heart sounds: No murmur heard  No friction rub  Pulmonary:      Effort: Pulmonary effort is normal       Breath sounds: Normal breath sounds  No wheezing or rales  Abdominal:      General: Abdomen is flat  There is no distension  Palpations: Abdomen is soft  Tenderness: There is no abdominal tenderness  Musculoskeletal:         General: No swelling or tenderness  Skin:     General: Skin is warm and dry  Findings: No erythema  Neurological:      Mental Status: He is alert  Mental status is at baseline  Motor: No weakness  Psychiatric:         Mood and Affect: Mood normal          Behavior: Behavior normal          Pertinent Laboratory/Diagnostic Studies:  No results found      Images and diagnostics reviewed     Current Medications     Current Outpatient Medications:     allopurinol (ZYLOPRIM) 300 mg tablet, Take 1 tablet by mouth daily, Disp: , Rfl:     Cholecalciferol (VITAMIN D3) 5000 units CAPS, Take 1 capsule by mouth daily, Disp: , Rfl:     Eliquis 5 MG, TAKE 1 TABLET BY MOUTH TWICE A DAY, Disp: 180 tablet, Rfl: 0    Esomeprazole Magnesium (NEXIUM PO), Take 20 mg by mouth daily , Disp: , Rfl:     ezetimibe (ZETIA) 10 mg tablet, Take 10 mg by mouth daily, Disp: , Rfl:     methylprednisolone (MEDROL) 4 mg tablet, Take 2 mg by mouth daily , Disp: , Rfl:     metoprolol succinate (TOPROL-XL) 50 mg 24 hr tablet, TAKE 1 TABLET BY MOUTH EVERY DAY, Disp: 90 tablet, Rfl: 0    Misc Natural Products (GLUCOSAMINE CHOND COMPLEX/MSM PO), Take by mouth 2 (two) times a day , Disp: , Rfl:     Multiple Vitamins-Minerals (MULTIVITAMIN ADULT PO), Take 1 tablet by mouth daily, Disp: , Rfl:     simvastatin (ZOCOR) 40 mg tablet, Take 40 mg by mouth daily at bedtime, Disp: , Rfl:     Health Maintenance     Health Maintenance   Topic Date Due    COVID-19 Vaccine (1) Never done    Influenza Vaccine (1) 09/01/2022    Fall Risk  09/16/2023    Depression Screening  09/16/2023    Medicare Annual Wellness Visit (AWV)  09/16/2023    BMI: Followup Plan  09/16/2023    BMI: Adult  09/16/2023    Pneumococcal Vaccine: 65+ Years  Completed    HIB Vaccine  Aged Out    Hepatitis B Vaccine  Aged Out    IPV Vaccine  Aged Out    Hepatitis A Vaccine  Aged Out    Meningococcal ACWY Vaccine  Aged Out    HPV Vaccine  Aged Out     Immunization History   Administered Date(s) Administered    Influenza Split High Dose Preservative Free IM 09/14/2017    Influenza, high dose seasonal 0 7 mL 10/03/2018    Pneumococcal Conjugate 13-Valent 09/14/2017    Pneumococcal Conjugate Vaccine 20-valent (Pcv20), Polysace 09/16/2022       KASI Fontanez  Internal Medicine - [address], [facility] [id number]  Hospitals in [place], [address]   Office: [phone]  Fax: [phone]      Answers for HPI/ROS submitted by the patient on 9/13/2022  How often during the last year have you found that you were not able to stop drinking once you had started?: 0 - never  How often during the last year have you failed to do what was normally expected from you because of drinking?: 0 - never  How often during the last year have you needed a first drink in the morning to get yourself going after a heavy drinking session?: 0 - never  How often during the last year have you had a feeling of guilt or remorse after drinking?: 0 - never  How often during the last year have you been unable to remember what happened the night before because you had been drinking?: 0 - never  Have you or someone else been injured as a result of your drinking?: 0 - no  Has a relative or friend or a doctor or another health worker been concerned about your drinking or suggested you cut down?: 0 - no  How would you rate your overall health?: good  Compared to last year, how is your physical health?: slightly worse  In general, how satisfied are you with your life?: very satisfied  Compared to last year, how is your eyesight?: slightly worse  Compared to last year, how is your hearing?: same  Compared to last year, how is your emotional/mental health?: same  How often is anger a problem for you?: never, rarely  How often do you feel unusually tired/fatigued?: sometimes  In the past 7 days, how much pain have you experienced?: some  If you answered "some" or "a lot", please rate the severity of your pain on a scale of 1 to 10 (1 being the least severe pain and 10 being the most intense pain)  : 2/10  In the past 6 months, have you lost or gained 10 pounds without trying?: No  One or more falls in the last year: No  Do you have trouble with the stairs inside or outside your home?: No  Does your home have working smoke alarms?: Yes  Does your home have a carbon monoxide monitor?: No  Which safety hazards (if any) have you experienced in your home? Please select all that apply : none  How would you describe your current diet?  Please select all that apply : Regular  In addition to prescription medications, are you taking any over-the-counter supplements?: No  Can you manage your medications?: Yes  Are you currently taking any opioid medications?: No  Can you walk and transfer into and out of your bed and chair?: Yes  Can you dress and groom yourself?: Yes  Can you bathe or shower yourself?: Yes  Can you feed yourself?: Yes  Can you do your laundry/ housekeeping?: Yes  Can you manage your money, pay your bills, and track your expenses?: Yes  Can you make your own meals?: Yes  Can you do your own shopping?: Yes  Within the last 12 months, have you had any hospitalizations or Emergency Department visits?: No  Do you have a living will?: Yes  Do you have a Durable POA (Power of ) for healthcare decisions?: Yes  Do you have an Advanced Directive for end of life decisions?: Yes  How often have you used an illegal drug (including marijuana) or a prescription medication for non-medical reasons in the past year?: never  What is the typical number of drinks you consume in a day?: 4  What is the typical number of drinks you consume in a week?: 7  How often did you have a drink containing alcohol in the past year?: 4 or more times a week  How many drinks did you have on a typical day  when you were drinking in the past year?: 1 to 2  How often did you have 6 or more drinks on one occasion in the past year?: never

## 2022-09-15 NOTE — ASSESSMENT & PLAN NOTE
-Most recent echo available for review in 2018 - EF 45%  -on beta-blocker, not on afterload reducer   -follows with cardiologist in [place]    He states he is having follow-up echocardiogram done relatively soon

## 2022-09-15 NOTE — ASSESSMENT & PLAN NOTE
-Status post balloon angioplasty in the 1990s, CABG in [address], had PCI in 1999 per chart review  -not on aspirin as patient is on Eliquis  -on beta-blocker  -on simvastatin, Zetia  -goal LDL less than 70  -goal BP less than 130/80

## 2022-09-16 ENCOUNTER — OFFICE VISIT (OUTPATIENT)
Dept: INTERNAL MEDICINE CLINIC | Facility: CLINIC | Age: 87
End: 2022-09-16
Payer: MEDICARE

## 2022-09-16 VITALS
RESPIRATION RATE: 18 BRPM | TEMPERATURE: 96.5 F | OXYGEN SATURATION: 96 % | HEIGHT: 69 IN | SYSTOLIC BLOOD PRESSURE: 132 MMHG | BODY MASS INDEX: 25.18 KG/M2 | HEART RATE: 50 BPM | WEIGHT: 170 LBS | DIASTOLIC BLOOD PRESSURE: 64 MMHG

## 2022-09-16 DIAGNOSIS — E78.2 MIXED HYPERLIPIDEMIA: ICD-10-CM

## 2022-09-16 DIAGNOSIS — M1A.00X0 IDIOPATHIC CHRONIC GOUT WITHOUT TOPHUS, UNSPECIFIED SITE: ICD-10-CM

## 2022-09-16 DIAGNOSIS — I25.810 CORONARY ARTERY DISEASE INVOLVING CORONARY BYPASS GRAFT OF NATIVE HEART WITHOUT ANGINA PECTORIS: ICD-10-CM

## 2022-09-16 DIAGNOSIS — Z23 ENCOUNTER FOR IMMUNIZATION: ICD-10-CM

## 2022-09-16 DIAGNOSIS — M35.3 POLYMYALGIA RHEUMATICA (HCC): ICD-10-CM

## 2022-09-16 DIAGNOSIS — I10 PRIMARY HYPERTENSION: Primary | ICD-10-CM

## 2022-09-16 DIAGNOSIS — I50.22 CHRONIC SYSTOLIC HEART FAILURE (HCC): ICD-10-CM

## 2022-09-16 DIAGNOSIS — Z00.00 MEDICARE ANNUAL WELLNESS VISIT, SUBSEQUENT: ICD-10-CM

## 2022-09-16 DIAGNOSIS — I48.92 ATRIAL FLUTTER, UNSPECIFIED TYPE (HCC): ICD-10-CM

## 2022-09-16 PROCEDURE — 90677 PCV20 VACCINE IM: CPT

## 2022-09-16 PROCEDURE — G0009 ADMIN PNEUMOCOCCAL VACCINE: HCPCS

## 2022-09-16 PROCEDURE — G0439 PPPS, SUBSEQ VISIT: HCPCS

## 2022-09-16 PROCEDURE — 99214 OFFICE O/P EST MOD 30 MIN: CPT

## 2022-09-16 NOTE — PROGRESS NOTES
Answers for HPI/ROS submitted by the patient on 9/13/2022  How often during the last year have you found that you were not able to stop drinking once you had started?: 0 - never  How often during the last year have you failed to do what was normally expected from you because of drinking?: 0 - never  How often during the last year have you needed a first drink in the morning to get yourself going after a heavy drinking session?: 0 - never  How often during the last year have you had a feeling of guilt or remorse after drinking?: 0 - never  How often during the last year have you been unable to remember what happened the night before because you had been drinking?: 0 - never  Have you or someone else been injured as a result of your drinking?: 0 - no  Has a relative or friend or a doctor or another health worker been concerned about your drinking or suggested you cut down?: 0 - no  How would you rate your overall health?: good  Compared to last year, how is your physical health?: slightly worse  In general, how satisfied are you with your life?: very satisfied  Compared to last year, how is your eyesight?: slightly worse  Compared to last year, how is your hearing?: same  Compared to last year, how is your emotional/mental health?: same  How often is anger a problem for you?: never, rarely  How often do you feel unusually tired/fatigued?: sometimes  In the past 7 days, how much pain have you experienced?: some  If you answered "some" or "a lot", please rate the severity of your pain on a scale of 1 to 10 (1 being the least severe pain and 10 being the most intense pain)  : 2/10  In the past 6 months, have you lost or gained 10 pounds without trying?: No  One or more falls in the last year: No  Do you have trouble with the stairs inside or outside your home?: No  Does your home have working smoke alarms?: Yes  Does your home have a carbon monoxide monitor?: No  Which safety hazards (if any) have you experienced in your home? Please select all that apply : none  How would you describe your current diet?  Please select all that apply : Regular  In addition to prescription medications, are you taking any over-the-counter supplements?: No  Can you manage your medications?: Yes  Are you currently taking any opioid medications?: No  Can you walk and transfer into and out of your bed and chair?: Yes  Can you dress and groom yourself?: Yes  Can you bathe or shower yourself?: Yes  Can you feed yourself?: Yes  Can you do your laundry/ housekeeping?: Yes  Can you manage your money, pay your bills, and track your expenses?: Yes  Can you make your own meals?: Yes  Can you do your own shopping?: Yes  Within the last 12 months, have you had any hospitalizations or Emergency Department visits?: No  Do you have a living will?: Yes  Do you have a Durable POA (Power of ) for healthcare decisions?: Yes  Do you have an Advanced Directive for end of life decisions?: Yes  How often have you used an illegal drug (including marijuana) or a prescription medication for non-medical reasons in the past year?: never  What is the typical number of drinks you consume in a day?: 4  What is the typical number of drinks you consume in a week?: 7  How often did you have a drink containing alcohol in the past year?: 4 or more times a week  How many drinks did you have on a typical day  when you were drinking in the past year?: 1 to 2  How often did you have 6 or more drinks on one occasion in the past year?: never     Assessment and Plan:     Problem List Items Addressed This Visit        Cardiovascular and Mediastinum    Hypertension - Primary     Well controlled         Coronary artery disease involving coronary bypass graft     -Status post balloon angioplasty in the 1990s, CABG in 1995, had PCI in 1999 per chart review  -not on aspirin as patient is on Eliquis  -on beta-blocker  -on simvastatin, Zetia  -goal LDL less than 70  -goal BP less than 130/80         Atrial flutter (HCC)     Diagnosed with atrial flutter around 2018, status post ablation  On Toprol-XL for rate control, Eliquis for stroke prevention         Chronic systolic heart failure ([facility] [address] )     -Most recent echo available for review in 2018 - EF 45%  -on beta-blocker, not on afterload reducer   -follows with cardiologist in [place]  He states he is having follow-up echocardiogram done relatively soon                Other    Polymyalgia rheumatica ([facility] [address] )     Maintained on chronic methylprednisolone, 2 mg daily  -follows with rheumatologist in [place]  He states he has been on steroids for about 3 years  He does not believe he had DEXA scan previously  I advised him to discuss with his rheumatologist as this would be warranted to evaluate for bone density loss given chronic steroid use         Hyperlipidemia     On simvastatin and Zetia  Due for repeat lipid panel, patient states he gets labs done in [place]         Gout     On allopurinol, no recent flares reported           Other Visit Diagnoses     Encounter for immunization        Relevant Orders    Pneumococcal Conjugate Vaccine 20-valent (Pcv20) (Completed)    Medicare annual wellness visit, subsequent               Preventive health issues were discussed with patient, and age appropriate screening tests were ordered as noted in patient's After Visit Summary  Personalized health advice and appropriate referrals for health education or preventive services given if needed, as noted in patient's After Visit Summary       History of Present Illness:     Patient presents for a Medicare Wellness Visit    HPI   Patient Care Team:  Andres Barillas DO as PCP - General (Internal Medicine)     Review of Systems:     Review of Systems     Problem List:     Patient Active Problem List   Diagnosis    Polymyalgia rheumatica ([facility] [address] )    Hypertension    Hyperlipidemia    Gout    Coronary artery disease involving coronary bypass graft    Atrial hattie ([facility] [address] )    Chronic systolic heart failure (HCC)    Current use of long term anticoagulation      Past Medical and Surgical History:     History reviewed  No pertinent past medical history    Past Surgical History:   Procedure Laterality Date    ANGIOPLASTY      CATARACT EXTRACTION Bilateral     CORONARY ARTERY BYPASS GRAFT        Family History:     Family History   Problem Relation Age of Onset    Alzheimer's disease Mother     Cancer Father       Social History:     Social History     Socioeconomic History    Marital status: /Civil Union     Spouse name: None    Number of children: None    Years of education: None    Highest education level: None   Occupational History    None   Tobacco Use    Smoking status: Former Smoker    Smokeless tobacco: Never Used   Substance and Sexual Activity    Alcohol use: Yes     Comment: one a day     Drug use: No    Sexual activity: Not Currently   Other Topics Concern    None   Social History Narrative    None     Social Determinants of Health     Financial Resource Strain: Not on file   Food Insecurity: Not on file   Transportation Needs: Not on file   Physical Activity: Not on file   Stress: Not on file   Social Connections: Not on file   Intimate Partner Violence: Not on file   Housing Stability: Not on file      Medications and Allergies:     Current Outpatient Medications   Medication Sig Dispense Refill    allopurinol (ZYLOPRIM) 300 mg tablet Take 1 tablet by mouth daily      Cholecalciferol (VITAMIN D3) 5000 units CAPS Take 1 capsule by mouth daily      Eliquis 5 MG TAKE 1 TABLET BY MOUTH TWICE A  tablet 0    Esomeprazole Magnesium (NEXIUM PO) Take 20 mg by mouth daily       ezetimibe (ZETIA) 10 mg tablet Take 10 mg by mouth daily      methylprednisolone (MEDROL) 4 mg tablet Take 2 mg by mouth daily       metoprolol succinate (TOPROL-XL) 50 mg 24 hr tablet TAKE 1 TABLET BY MOUTH EVERY DAY 90 tablet 0    Misc Natural Products (GLUCOSAMINE CHOND COMPLEX/MSM PO) Take by mouth 2 (two) times a day       Multiple Vitamins-Minerals (MULTIVITAMIN ADULT PO) Take 1 tablet by mouth daily      simvastatin (ZOCOR) 40 mg tablet Take 40 mg by mouth daily at bedtime       No current facility-administered medications for this visit  No Known Allergies   Immunizations:     Immunization History   Administered Date(s) Administered    Influenza Split High Dose Preservative Free IM 09/14/2017    Influenza, high dose seasonal 0 7 mL 10/03/2018    Pneumococcal Conjugate 13-Valent 09/14/2017    Pneumococcal Conjugate Vaccine 20-valent (Pcv20), Polysace 09/16/2022      Health Maintenance: There are no preventive care reminders to display for this patient  Topic Date Due    COVID-19 Vaccine (1) Never done    Influenza Vaccine (1) 09/01/2022      Medicare Screening Tests and Risk Assessments:     Lanette Gosselin is here for his Subsequent Wellness visit  Health Risk Assessment:   Patient rates overall health as good  Patient feels that their physical health rating is slightly worse  Patient is very satisfied with their life  Eyesight was rated as slightly worse  Hearing was rated as same  Patient feels that their emotional and mental health rating is same  Patients states they are never, rarely angry  Patient states they are sometimes unusually tired/fatigued  Pain experienced in the last 7 days has been some  Patient's pain rating has been 2/10  Patient states that he has experienced no weight loss or gain in last 6 months  Depression Screening:   PHQ-2 Score: 0      Fall Risk Screening: In the past year, patient has experienced: no history of falling in past year      Home Safety:  Patient does not have trouble with stairs inside or outside of their home  Patient has working smoke alarms and has no working carbon monoxide detector  Home safety hazards include: none  Nutrition:   Current diet is Regular       Medications:   Patient is not currently taking any over-the-counter supplements  Patient is able to manage medications  Activities of Daily Living (ADLs)/Instrumental Activities of Daily Living (IADLs):   Walk and transfer into and out of bed and chair?: Yes  Dress and groom yourself?: Yes    Bathe or shower yourself?: Yes    Feed yourself? Yes  Do your laundry/housekeeping?: Yes  Manage your money, pay your bills and track your expenses?: Yes  Make your own meals?: Yes    Do your own shopping?: Yes    Previous Hospitalizations:   Any hospitalizations or ED visits within the last 12 months?: No      Advance Care Planning:   Living will: Yes    Durable POA for healthcare: Yes    Advanced directive: Yes      Cognitive Screening:   Provider or family/friend/caregiver concerned regarding cognition?: No    PREVENTIVE SCREENINGS      Cardiovascular Screening:    General: Screening Not Indicated and History Lipid Disorder      Colorectal Cancer Screening:     General: Screening Not Indicated      Prostate Cancer Screening:    General: Screening Not Indicated      Osteoporosis Screening:    General: Risks and Benefits Discussed      Abdominal Aortic Aneurysm (AAA) Screening:    Risk factors include: tobacco use        Lung Cancer Screening:     General: Screening Not Indicated      Hepatitis C Screening:    General: Risks and Benefits Discussed    Screening, Brief Intervention, and Referral to Treatment (SBIRT)    Screening  Typical number of drinks in a day: 4  Typical number of drinks in a week: 7  Interpretation: Low risk drinking behavior  AUDIT-C Screenin) How often did you have a drink containing alcohol in the past year? 4 or more times a week  2) How many drinks did you have on a typical day when you were drinking in the past year?  1 to 2  3) How often did you have 6 or more drinks on one occasion in the past year? never    AUDIT-C Score: 4  Interpretation: Score 4-12 (male): POSITIVE screen for alcohol misuse    AUDIT Screenin) How often during the last year have you found that you were not able to stop drinking once you had started? 0 - never  5) How often during the last year have you failed to do what was normally expected from you because of drinking? 0 - never  6) How often during the last year have you needed a first drink in the morning to get yourself going after a heavy drinking session? 0 - never  7) How often during the last year have you had a feeling of guilt or remorse after drinking? 0 - never  8) How often during the last year have you been unable to remember what happened the night before because you had been drinking? 0 - never  9) Have you or someone else been injured as a result of your drinking? 0 - no  10) Has a relative or friend or a doctor or another health worker been concerned about your drinking or suggested you cut down? 0 - no    AUDIT Score: 4  Interpretation: Low risk alcohol consumption    Single Item Drug Screening:  How often have you used an illegal drug (including marijuana) or a prescription medication for non-medical reasons in the past year? never    Single Item Drug Screen Score: 0  Interpretation: Negative screen for possible drug use disorder    Brief Intervention  Alcohol & drug use screenings were reviewed  No concerns regarding substance use disorder identified       No exam data present     Physical Exam:     /64 (BP Location: Left arm, Patient Position: Sitting, Cuff Size: Standard)   Pulse (!) 50   Temp (!) 96 5 °F (35 8 °C)   Resp 18   Ht 5' 9" (1 753 m)   Wt 77 1 kg (170 lb)   SpO2 96%   BMI 25 10 kg/m²     Physical Exam     Andres De Jesus, DO

## 2022-09-16 NOTE — PATIENT INSTRUCTIONS
Medicare Preventive Visit Patient Instructions  Thank you for completing your Welcome to Medicare Visit or Medicare Annual Wellness Visit today  Your next wellness visit will be due in one year (9/17/2023)  The screening/preventive services that you may require over the next 5-10 years are detailed below  Some tests may not apply to you based off risk factors and/or age  Screening tests ordered at today's visit but not completed yet may show as past due  Also, please note that scanned in results may not display below  Preventive Screenings:  Service Recommendations Previous Testing/Comments   Colorectal Cancer Screening  · Colonoscopy    · Fecal Occult Blood Test (FOBT)/Fecal Immunochemical Test (FIT)  · Fecal DNA/Cologuard Test  · Flexible Sigmoidoscopy Age: 39-70 years old   Colonoscopy: every 10 years (May be performed more frequently if at higher risk)  OR  FOBT/FIT: every 1 year  OR  Cologuard: every 3 years  OR  Sigmoidoscopy: every 5 years  Screening may be recommended earlier than age 39 if at higher risk for colorectal cancer  Also, an individualized decision between you and your healthcare provider will decide whether screening between the ages of 74-80 would be appropriate   Colonoscopy: Not on file  FOBT/FIT: Not on file  Cologuard: Not on file  Sigmoidoscopy: Not on file    Screening Not Indicated     Prostate Cancer Screening Individualized decision between patient and health care provider in men between ages of 53-78   Medicare will cover every 12 months beginning on the day after your 50th birthday PSA: No results in last 5 years     Screening Not Indicated     Hepatitis C Screening Once for adults born between [facility]  More frequently in patients at high risk for Hepatitis C Hep C Antibody: Not on file        Diabetes Screening 1-2 times per year if you're at risk for diabetes or have pre-diabetes Fasting glucose: 112 mg/dL (9/12/2018)  A1C: No results in last 5 years (No results in last 5 years)      Cholesterol Screening Once every 5 years if you don't have a lipid disorder  May order more often based on risk factors  Lipid panel: Not on file  Screening Not Indicated  History Lipid Disorder      Other Preventive Screenings Covered by Medicare:  1  Abdominal Aortic Aneurysm (AAA) Screening: covered once if your at risk  You're considered to be at risk if you have a family history of AAA or a male between the age of 73-68 who smoking at least 100 cigarettes in your lifetime  2  Lung Cancer Screening: covers low dose CT scan once per year if you meet all of the following conditions: (1) Age 50-69; (2) No signs or symptoms of lung cancer; (3) Current smoker or have quit smoking within the last 15 years; (4) You have a tobacco smoking history of at least 20 pack years (packs per day x number of years you smoked); (5) You get a written order from a healthcare provider  3  Glaucoma Screening: covered annually if you're considered high risk: (1) You have diabetes OR (2) Family history of glaucoma OR (3)  aged 48 and older OR (3)  American aged 72 and older  3  Osteoporosis Screening: covered every 2 years if you meet one of the following conditions: (1) Have a vertebral abnormality; (2) On glucocorticoid therapy for more than 3 months; (3) Have primary hyperparathyroidism; (4) On osteoporosis medications and need to assess response to drug therapy  5  HIV Screening: covered annually if you're between the age of 12-76  Also covered annually if you are younger than 13 and older than 72 with risk factors for HIV infection  For pregnant patients, it is covered up to 3 times per pregnancy      Immunizations:  Immunization Recommendations   Influenza Vaccine Annual influenza vaccination during flu season is recommended for all persons aged >= 6 months who do not have contraindications   Pneumococcal Vaccine   * Pneumococcal conjugate vaccine = PCV13 (Prevnar 13), PCV15 (Vaxneuvance), PCV20 (Prevnar 20)  * Pneumococcal polysaccharide vaccine = PPSV23 (Pneumovax) Adults 2364 years old: 1-3 doses may be recommended based on certain risk factors  Adults 72 years old: 1-2 doses may be recommended based off what pneumonia vaccine you previously received   Hepatitis B Vaccine 3 dose series if at intermediate or high risk (ex: diabetes, end stage renal disease, liver disease)   Tetanus (Td) Vaccine - COST NOT COVERED BY MEDICARE PART B Following completion of primary series, a booster dose should be given every 10 years to maintain immunity against tetanus  Td may also be given as tetanus wound prophylaxis  Tdap Vaccine - COST NOT COVERED BY MEDICARE PART B Recommended at least once for all adults  For pregnant patients, recommended with each pregnancy  Shingles Vaccine (Shingrix) - COST NOT COVERED BY MEDICARE PART B  2 shot series recommended in those aged 48 and above     Health Maintenance Due:  There are no preventive care reminders to display for this patient  Immunizations Due:      Topic Date Due    COVID-19 Vaccine (1) Never done    Pneumococcal Vaccine: 65+ Years (2 - PPSV23 or PCV20) 09/14/2018    Influenza Vaccine (1) 09/01/2022     Advance Directives   What are advance directives? Advance directives are legal documents that state your wishes and plans for medical care  These plans are made ahead of time in case you lose your ability to make decisions for yourself  Advance directives can apply to any medical decision, such as the treatments you want, and if you want to donate organs  What are the types of advance directives? There are many types of advance directives, and each state has rules about how to use them  You may choose a combination of any of the following:  · Living will: This is a written record of the treatment you want  You can also choose which treatments you do not want, which to limit, and which to stop at a certain time   This includes surgery, medicine, IV fluid, and tube feedings  · Durable power of  for healthcare [facility]): This is a written record that states who you want to make healthcare choices for you when you are unable to make them for yourself  This person, called a proxy, is usually a family member or a friend  You may choose more than 1 proxy  · Do not resuscitate (DNR) order:  A DNR order is used in case your heart stops beating or you stop breathing  It is a request not to have certain forms of treatment, such as CPR  A DNR order may be included in other types of advance directives  · Medical directive: This covers the care that you want if you are in a coma, near death, or unable to make decisions for yourself  You can list the treatments you want for each condition  Treatment may include pain medicine, surgery, blood transfusions, dialysis, IV or tube feedings, and a ventilator (breathing machine)  · Values history: This document has questions about your views, beliefs, and how you feel and think about life  This information can help others choose the care that you would choose  Why are advance directives important? An advance directive helps you control your care  Although spoken wishes may be used, it is better to have your wishes written down  Spoken wishes can be misunderstood, or not followed  Treatments may be given even if you do not want them  An advance directive may make it easier for your family to make difficult choices about your care  Weight Management   Why it is important to manage your weight:  Being overweight increases your risk of health conditions such as heart disease, high blood pressure, type 2 diabetes, and certain types of cancer  It can also increase your risk for osteoarthritis, sleep apnea, and other respiratory problems  Aim for a slow, steady weight loss  Even a small amount of weight loss can lower your risk of health problems    How to lose weight safely:  A safe and healthy way to lose weight is to eat fewer calories and get regular exercise  You can lose up about 1 pound a week by decreasing the number of calories you eat by 500 calories each day  Healthy meal plan for weight management:  A healthy meal plan includes a variety of foods, contains fewer calories, and helps you stay healthy  A healthy meal plan includes the following:  · Eat whole-grain foods more often  A healthy meal plan should contain fiber  Fiber is the part of grains, fruits, and vegetables that is not broken down by your body  Whole-grain foods are healthy and provide extra fiber in your diet  Some examples of whole-grain foods are whole-wheat breads and pastas, oatmeal, brown rice, and bulgur  · Eat a variety of vegetables every day  Include dark, leafy greens such as spinach, kale, esperanza greens, and mustard greens  Eat yellow and orange vegetables such as carrots, sweet potatoes, and winter squash  · Eat a variety of fruits every day  Choose fresh or canned fruit (canned in its own juice or light syrup) instead of juice  Fruit juice has very little or no fiber  · Eat low-fat dairy foods  Drink fat-free (skim) milk or 1% milk  Eat fat-free yogurt and low-fat cottage cheese  Try low-fat cheeses such as mozzarella and other reduced-fat cheeses  · Choose meat and other protein foods that are low in fat  Choose beans or other legumes such as split peas or lentils  Choose fish, skinless poultry (chicken or turkey), or lean cuts of red meat (beef or pork)  Before you cook meat or poultry, cut off any visible fat  · Use less fat and oil  Try baking foods instead of frying them  Add less fat, such as margarine, sour cream, regular salad dressing and mayonnaise to foods  Eat fewer high-fat foods  Some examples of high-fat foods include french fries, doughnuts, ice cream, and cakes  · Eat fewer sweets  Limit foods and drinks that are high in sugar  This includes candy, cookies, regular soda, and sweetened drinks    Exercise: Exercise at least 30 minutes per day on most days of the week  Some examples of exercise include walking, biking, dancing, and swimming  You can also fit in more physical activity by taking the stairs instead of the elevator or parking farther away from stores  Ask your healthcare provider about the best exercise plan for you  Alcohol Use and Your Health    Drinking too much can harm your health  Excessive alcohol use leads to about 88,000 death in the [place] each year, and shortens the life of those who diet by almost 30 years  Further, excessive drinking cost the economy $249 billion in 2010  Most excessive drinkers are not alcohol dependent  Excessive alcohol use has immediate effects that increase the risk of many harmful health conditions  These are most often the result of binge drinking  Over time, excessive alcohol use can lead to the development of chronic diseases and other series health problems  What is considered a "drink"? Excessive alcohol use includes:  · Binge Drinking: For women, 4 or more drinks consumed on one occasion  For men, 5 or more drinks consumed on one occasion  · Heavy Drinking: For women, 8 or more drinks per week  For men, 15 or more drinks per week  · Any alcohol used by pregnant women  · Any alcohol used by those under the age of 21 years    If you choose to drink, do so in moderation:  · Do not drink at all if you are under the age of 24, or if you are or may be pregnant, or have health problems that could be made worse by drinking    · For women, up to 1 drink per day  · For men, up to 2 drinks a day    No one should begin drinking or drink more frequently based on potential health benefits    Short-Term Health Risks:  · Injuries: motor vehicle crashes, falls, drownings, burns  · Violence: homicide, suicide, sexual assault, intimate partner violence  · Alcohol poisoning  · Reproductive health: risky sexual behaviors, unintended prengnacy, sexually transmitted diseases, miscarriage, stillbirth, fetal alcohol syndrome    Long-Term Health Risks:  · Chronic diseases: high blood pressure, heart disease, stroke, liver disease, digestive problems  · Cancers: breast, mouth and throat, liver, colon  · Learning and memory problems: dementia, poor school performance  · Mental health: depression, anxiety, insomnia  · Social problems: lost productivity, family problems, unemployment  · Alcohol dependence    For support and more information:  · Substance Abuse and [facility] [address] [place]  Web Address: https://Aud[employer]/    · Alcoholics Anonymous        Web Address: http://www lockhart info/    https://www cdc gov/alcohol/fact-sheets/alcohol-use htm     © [facility] [facility] 2018 Information is for End User's use only and may not be sold, redistributed or otherwise used for commercial purposes   All illustrations and images included in CareNotes® are the copyrighted property of A D A M , Inc  or [address] [place] [address]

## 2023-12-13 ENCOUNTER — TELEPHONE (OUTPATIENT)
Dept: INTERNAL MEDICINE CLINIC | Facility: CLINIC | Age: 88
End: 2023-12-13

## 2024-04-03 ENCOUNTER — TELEPHONE (OUTPATIENT)
Dept: INTERNAL MEDICINE CLINIC | Facility: CLINIC | Age: 89
End: 2024-04-03

## 2024-04-03 NOTE — TELEPHONE ENCOUNTER
Called patient to make an appointment for an Annual wellness visit and he is in [place] and did not want to schedule.

## 2024-06-24 ENCOUNTER — TELEPHONE (OUTPATIENT)
Dept: INTERNAL MEDICINE CLINIC | Facility: CLINIC | Age: 89
End: 2024-06-24

## 2024-06-24 ENCOUNTER — HOSPITAL ENCOUNTER (OUTPATIENT)
Dept: RADIOLOGY | Facility: HOSPITAL | Age: 89
Discharge: HOME | End: 2024-06-24
Payer: MEDICARE

## 2024-06-24 ENCOUNTER — OFFICE VISIT (OUTPATIENT)
Dept: INTERNAL MEDICINE CLINIC | Facility: CLINIC | Age: 89
End: 2024-06-24
Payer: MEDICARE

## 2024-06-24 VITALS
DIASTOLIC BLOOD PRESSURE: 80 MMHG | WEIGHT: 169 LBS | RESPIRATION RATE: 18 BRPM | SYSTOLIC BLOOD PRESSURE: 120 MMHG | BODY MASS INDEX: 25.03 KG/M2 | TEMPERATURE: 96.3 F | HEIGHT: 69 IN | OXYGEN SATURATION: 95 % | HEART RATE: 61 BPM

## 2024-06-24 DIAGNOSIS — Z00.00 MEDICARE ANNUAL WELLNESS VISIT, SUBSEQUENT: Primary | ICD-10-CM

## 2024-06-24 DIAGNOSIS — I48.92 ATRIAL FLUTTER, UNSPECIFIED TYPE (HCC): ICD-10-CM

## 2024-06-24 DIAGNOSIS — M35.3 POLYMYALGIA RHEUMATICA (HCC): ICD-10-CM

## 2024-06-24 DIAGNOSIS — R05.1 ACUTE COUGH: ICD-10-CM

## 2024-06-24 DIAGNOSIS — J18.9 COMMUNITY ACQUIRED PNEUMONIA OF RIGHT LUNG, UNSPECIFIED PART OF LUNG: Primary | ICD-10-CM

## 2024-06-24 DIAGNOSIS — Z79.52 CURRENT CHRONIC USE OF SYSTEMIC STEROIDS: ICD-10-CM

## 2024-06-24 DIAGNOSIS — R79.9 ABNORMAL FINDING OF BLOOD CHEMISTRY, UNSPECIFIED: ICD-10-CM

## 2024-06-24 DIAGNOSIS — R79.89 ELEVATED SERUM CREATININE: ICD-10-CM

## 2024-06-24 DIAGNOSIS — I50.22 CHRONIC SYSTOLIC HEART FAILURE (HCC): ICD-10-CM

## 2024-06-24 DIAGNOSIS — I25.810 CORONARY ARTERY DISEASE INVOLVING CORONARY BYPASS GRAFT OF NATIVE HEART WITHOUT ANGINA PECTORIS: ICD-10-CM

## 2024-06-24 DIAGNOSIS — M1A.00X0 IDIOPATHIC CHRONIC GOUT WITHOUT TOPHUS, UNSPECIFIED SITE: ICD-10-CM

## 2024-06-24 DIAGNOSIS — E78.2 MIXED HYPERLIPIDEMIA: ICD-10-CM

## 2024-06-24 DIAGNOSIS — I10 PRIMARY HYPERTENSION: ICD-10-CM

## 2024-06-24 PROCEDURE — 71046 X-RAY EXAM CHEST 2 VIEWS: CPT

## 2024-06-24 PROCEDURE — 99214 OFFICE O/P EST MOD 30 MIN: CPT | Performed by: INTERNAL MEDICINE

## 2024-06-24 PROCEDURE — G0438 PPPS, INITIAL VISIT: HCPCS | Performed by: INTERNAL MEDICINE

## 2024-06-24 RX ORDER — AMOXICILLIN AND CLAVULANATE POTASSIUM 875; 125 MG/1; MG/1
1 TABLET, FILM COATED ORAL EVERY 12 HOURS SCHEDULED
Qty: 14 TABLET | Refills: 0 | Status: SHIPPED | OUTPATIENT
Start: 2024-06-24 | End: 2024-07-01

## 2024-06-24 RX ORDER — AZITHROMYCIN 250 MG/1
TABLET, FILM COATED ORAL
Qty: 6 TABLET | Refills: 0 | Status: SHIPPED | OUTPATIENT
Start: 2024-06-24 | End: 2024-06-29

## 2024-06-24 NOTE — PATIENT INSTRUCTIONS
Medicare Preventive Visit Patient Instructions  Thank you for completing your Welcome to Medicare Visit or Medicare Annual Wellness Visit today. Your next wellness visit will be due in one year (6/25/2025).  The screening/preventive services that you may require over the next 5-10 years are detailed below. Some tests may not apply to you based off risk factors and/or age. Screening tests ordered at today's visit but not completed yet may show as past due. Also, please note that scanned in results may not display below.  Preventive Screenings:  Service Recommendations Previous Testing/Comments   Colorectal Cancer Screening  Colonoscopy    Fecal Occult Blood Test (FOBT)/Fecal Immunochemical Test (FIT)  Fecal DNA/Cologuard Test  Flexible Sigmoidoscopy Age: 45-75 years old   Colonoscopy: every 10 years (May be performed more frequently if at higher risk)  OR  FOBT/FIT: every 1 year  OR  Cologuard: every 3 years  OR  Sigmoidoscopy: every 5 years  Screening may be recommended earlier than age 45 if at higher risk for colorectal cancer. Also, an individualized decision between you and your healthcare provider will decide whether screening between the ages of 76-85 would be appropriate. Colonoscopy: Not on file  FOBT/FIT: Not on file  Cologuard: Not on file  Sigmoidoscopy: Not on file    Screening Not Indicated     Prostate Cancer Screening Individualized decision between patient and health care provider in men between ages of 55-69   Medicare will cover every 12 months beginning on the day after your 50th birthday PSA: No results in last 5 years     Screening Not Indicated     Hepatitis C Screening Once for adults born between 1945 and 1965  More frequently in patients at high risk for Hepatitis C Hep C Antibody: Not on file        Diabetes Screening 1-2 times per year if you're at risk for diabetes or have pre-diabetes Fasting glucose: No results in last 5 years (No results in last 5 years)  A1C: No results in last 5  years (No results in last 5 years)      Cholesterol Screening Once every 5 years if you don't have a lipid disorder. May order more often based on risk factors. Lipid panel: Not on file  Screening Not Indicated  History Lipid Disorder      Other Preventive Screenings Covered by Medicare:  Abdominal Aortic Aneurysm (AAA) Screening: covered once if your at risk. You're considered to be at risk if you have a family history of AAA or a male between the age of 65-75 who smoking at least 100 cigarettes in your lifetime.  Lung Cancer Screening: covers low dose CT scan once per year if you meet all of the following conditions: (1) Age 55-77; (2) No signs or symptoms of lung cancer; (3) Current smoker or have quit smoking within the last 15 years; (4) You have a tobacco smoking history of at least 20 pack years (packs per day x number of years you smoked); (5) You get a written order from a healthcare provider.  Glaucoma Screening: covered annually if you're considered high risk: (1) You have diabetes OR (2) Family history of glaucoma OR (3)  aged 50 and older OR (4)  American aged 65 and older  Osteoporosis Screening: covered every 2 years if you meet one of the following conditions: (1) Have a vertebral abnormality; (2) On glucocorticoid therapy for more than 3 months; (3) Have primary hyperparathyroidism; (4) On osteoporosis medications and need to assess response to drug therapy.  HIV Screening: covered annually if you're between the age of 15-65. Also covered annually if you are younger than 15 and older than 65 with risk factors for HIV infection. For pregnant patients, it is covered up to 3 times per pregnancy.    Immunizations:  Immunization Recommendations   Influenza Vaccine Annual influenza vaccination during flu season is recommended for all persons aged >= 6 months who do not have contraindications   Pneumococcal Vaccine   * Pneumococcal conjugate vaccine = PCV13 (Prevnar 13), PCV15  (Vaxneuvance), PCV20 (Prevnar 20)  * Pneumococcal polysaccharide vaccine = PPSV23 (Pneumovax) Adults 19-63 yo with certain risk factors or if 65+ yo  If never received any pneumonia vaccine: recommend Prevnar 20 (PCV20)  Give PCV20 if previously received 1 dose of PCV13 or PPSV23   Hepatitis B Vaccine 3 dose series if at intermediate or high risk (ex: diabetes, end stage renal disease, liver disease)   Respiratory syncytial virus (RSV) Vaccine - COVERED BY MEDICARE PART D  * RSVPreF3 (Arexvy) CDC recommends that adults 60 years of age and older may receive a single dose of RSV vaccine using shared clinical decision-making (SCDM)   Tetanus (Td) Vaccine - COST NOT COVERED BY MEDICARE PART B Following completion of primary series, a booster dose should be given every 10 years to maintain immunity against tetanus. Td may also be given as tetanus wound prophylaxis.   Tdap Vaccine - COST NOT COVERED BY MEDICARE PART B Recommended at least once for all adults. For pregnant patients, recommended with each pregnancy.   Shingles Vaccine (Shingrix) - COST NOT COVERED BY MEDICARE PART B  2 shot series recommended in those 19 years and older who have or will have weakened immune systems or those 50 years and older     Health Maintenance Due:  There are no preventive care reminders to display for this patient.  Immunizations Due:      Topic Date Due   • COVID-19 Vaccine (1 - 2023-24 season) Never done   • Influenza Vaccine (Season Ended) 09/01/2024     Advance Directives   What are advance directives?  Advance directives are legal documents that state your wishes and plans for medical care. These plans are made ahead of time in case you lose your ability to make decisions for yourself. Advance directives can apply to any medical decision, such as the treatments you want, and if you want to donate organs.   What are the types of advance directives?  There are many types of advance directives, and each state has rules about how to  use them. You may choose a combination of any of the following:  Living will:  This is a written record of the treatment you want. You can also choose which treatments you do not want, which to limit, and which to stop at a certain time. This includes surgery, medicine, IV fluid, and tube feedings.   Durable power of  for healthcare (DPAHC):  This is a written record that states who you want to make healthcare choices for you when you are unable to make them for yourself. This person, called a proxy, is usually a family member or a friend. You may choose more than 1 proxy.  Do not resuscitate (DNR) order:  A DNR order is used in case your heart stops beating or you stop breathing. It is a request not to have certain forms of treatment, such as CPR. A DNR order may be included in other types of advance directives.  Medical directive:  This covers the care that you want if you are in a coma, near death, or unable to make decisions for yourself. You can list the treatments you want for each condition. Treatment may include pain medicine, surgery, blood transfusions, dialysis, IV or tube feedings, and a ventilator (breathing machine).  Values history:  This document has questions about your views, beliefs, and how you feel and think about life. This information can help others choose the care that you would choose.  Why are advance directives important?  An advance directive helps you control your care. Although spoken wishes may be used, it is better to have your wishes written down. Spoken wishes can be misunderstood, or not followed. Treatments may be given even if you do not want them. An advance directive may make it easier for your family to make difficult choices about your care.       © Copyright [employer] 2018 Information is for End User's use only and may not be sold, redistributed or otherwise used for commercial purposes. All illustrations and images included in CareNotes® are the copyrighted  property of A.D.A.M., Inc. or [employer]

## 2024-06-24 NOTE — ASSESSMENT & PLAN NOTE
-Most recent echo available for review in 2018 - EF 45%  -on beta-blocker  -Not on ACEi/ARB/ARNI  -Not on MRA   -follows with cardiologist in [place]  -Appears euvolemic on exam  -Not currently on any diuretics

## 2024-06-24 NOTE — PROGRESS NOTES
Ambulatory Visit  Name: DARREN Fernando      : 1935      MRN: [id number]  Encounter Provider: Andres Henson DO  Encounter Date: 2024   Encounter department: [facility] INTERNAL MEDICINE [place]    Medical history of gout, hyperlipidemia, PMR, systolic CHF, atrial flutter, CAD status post CABG     Labs were completed 5/3/2024 at [facility].  These were reviewed today on patient's phone.  BUN 31  Creatinine 1.26, EGFR 55   sodium 139, potassium 4.4, chloride 106, CO2 24, calcium 8.6, total protein 6.1  Albumin 3.8  Total bilirubin 0.3  Alkaline phosphatase 44  AST 19  ALT 14    WBC count 8.8  Hemoglobin 12.4  .5  Platelet count 207    Assessment & Plan   1. Medicare annual wellness visit, subsequent  2. Atrial flutter, unspecified type (HCC)  Assessment & Plan:  Diagnosed with atrial flutter around 2018, status post ablation  On Toprol-XL for rate control, Eliquis for stroke prevention  3. Idiopathic chronic gout without tophus, unspecified site  Assessment & Plan:  On allopurinol, no recent flares reported  Orders:  -     Uric acid; Future  4. Mixed hyperlipidemia  Assessment & Plan:  On simvastatin and Zetia  Will check lipid panel with next set of labs  Orders:  -     CBC and differential; Future  -     Comprehensive metabolic panel; Future  -     TSH, 3rd generation with Free T4 reflex; Future  -     Lipid Panel with Direct LDL reflex; Future  5. Polymyalgia rheumatica (HCC)  Assessment & Plan:  Maintained on chronic methylprednisolone, 2 mg daily  Managed by rheumatology in [place]  6. Chronic systolic heart failure (HCC)  Assessment & Plan:  -Most recent echo available for review in 2018 - EF 45%  -on beta-blocker  -Not on ACEi/ARB/ARNI  -Not on MRA   -follows with cardiologist in [place]  -Appears euvolemic on exam  -Not currently on any diuretics      7. Coronary artery disease involving coronary bypass graft of native heart without angina pectoris  Assessment &  "Plan:  -Status post balloon angioplasty in the 1990s, CABG in 1995, had PCI in 1999 per chart review  -not on aspirin as patient is on Eliquis  -on beta-blocker  -on simvastatin, Zetia  -goal LDL less than 70  -goal BP less than 130/80  8. Current chronic use of systemic steroids  -     Hemoglobin A1C; Future  9. Primary hypertension  Assessment & Plan:  Well controlled  10. Abnormal finding of blood chemistry, unspecified  -     Hemoglobin A1C; Future  11. Acute cough  Assessment & Plan:  Patient reports onset of cough within the last 2 weeks or so.  He reports ongoing \"heavy chest congestion\".  Cough productive of green phlegm.  Has been afebrile for the last 4 days, states his temperature was running around 100 before them.  Overall feeling a bit better.  Has been taking Mucinex without much improvement in symptoms.  Lungs clear to auscultation today  -We will check x-ray to rule out consolidation  Orders:  -     XR chest pa & lateral; Future; Expected date: 06/24/2024  12. Elevated serum creatinine  Assessment & Plan:  Creatinine 1.26, EGFR of 55 on most recent set of labs.  This may be near baseline for the patient.  We reviewed potential diagnosis of CKD. Will continue to monitor for now        Preventive health issues were discussed with patient, and age appropriate screening tests were ordered as noted in patient's After Visit Summary. Personalized health advice and appropriate referrals for health education or preventive services given if needed, as noted in patient's After Visit Summary.    History of Present Illness     HPI   Patient Care Team:  Andres Henson DO as PCP - General (Internal Medicine)    Review of Systems  Medical History Reviewed by provider this encounter:  Meds  Problems       Annual Wellness Visit Questionnaire   DARREN Lisa is here for his Subsequent Wellness visit.     Health Risk Assessment:   Patient rates overall health as good. Patient feels that their physical health rating " is same. Patient is satisfied with their life. Eyesight was rated as slightly worse. Hearing was rated as same. Patient feels that their emotional and mental health rating is same. Patients states they are never, rarely angry. Patient states they are never, rarely unusually tired/fatigued. Pain experienced in the last 7 days has been some. Patient's pain rating has been 5/10. Patient states that he has experienced no weight loss or gain in last 6 months.     Depression Screening:   PHQ-2 Score: 0      Fall Risk Screening:   In the past year, patient has experienced: no history of falling in past year      Home Safety:  Patient does not have trouble with stairs inside or outside of their home. Patient has working smoke alarms and has working carbon monoxide detector. Home safety hazards include: none.     Nutrition:   Current diet is Regular.     Medications:   Patient is currently taking over-the-counter supplements. OTC medications include: see medication list. Patient is able to manage medications.     Activities of Daily Living (ADLs)/Instrumental Activities of Daily Living (IADLs):   Walk and transfer into and out of bed and chair?: Yes  Dress and groom yourself?: Yes    Bathe or shower yourself?: Yes    Feed yourself? Yes  Do your laundry/housekeeping?: Yes  Manage your money, pay your bills and track your expenses?: Yes  Make your own meals?: Yes    Do your own shopping?: Yes    Previous Hospitalizations:   Any hospitalizations or ED visits within the last 12 months?: No      Advance Care Planning:   Living will: Yes    Durable POA for healthcare: Yes    Advanced directive: Yes      Cognitive Screening:   Provider or family/friend/caregiver concerned regarding cognition?: No    PREVENTIVE SCREENINGS      Cardiovascular Screening:    General: History Lipid Disorder and Screening Not Indicated      Diabetes Screening:     General: Risks and Benefits Discussed    Due for: Blood Glucose      Colorectal Cancer  "Screening:     General: Screening Not Indicated      Prostate Cancer Screening:    General: Screening Not Indicated      Osteoporosis Screening:    General: Risks and Benefits Discussed    Due for: DXA Axial      Abdominal Aortic Aneurysm (AAA) Screening:    Risk factors include: tobacco use        Lung Cancer Screening:     General: Screening Not Indicated      Hepatitis C Screening:    General: Screening Not Indicated    Screening, Brief Intervention, and Referral to Treatment (SBIRT)    Screening  Typical number of drinks in a day: 0  Typical number of drinks in a week: 0  Interpretation: Low risk drinking behavior.    Single Item Drug Screening:  How often have you used an illegal drug (including marijuana) or a prescription medication for non-medical reasons in the past year? never    Single Item Drug Screen Score: 0  Interpretation: Negative screen for possible drug use disorder    Brief Intervention  Alcohol & drug use screenings were reviewed. No concerns regarding substance use disorder identified.     Social Determinants of Health     Food Insecurity: No Food Insecurity (6/24/2024)    Hunger Vital Sign    • Worried About Running Out of Food in the Last Year: Never true    • Ran Out of Food in the Last Year: Never true   Transportation Needs: No Transportation Needs (6/24/2024)    PRAPARE - Transportation    • Lack of Transportation (Medical): No    • Lack of Transportation (Non-Medical): No   Housing Stability: Low Risk  (6/24/2024)    Housing Stability Vital Sign    • Unable to Pay for Housing in the Last Year: No    • Number of Times Moved in the Last Year: 1    • Homeless in the Last Year: No   Utilities: Not At Risk (6/24/2024)    [facility] Utilities    • Threatened with loss of utilities: No     No results found.    Objective     /80 (BP Location: Left arm, Patient Position: Sitting, Cuff Size: Standard)   Pulse 61   Temp (!) 96.3 °F (35.7 °C)   Resp 18   Ht 5' 9\" (1.753 m)   Wt 76.7 kg (169 lb)  "  SpO2 95%   BMI 24.96 kg/m²     Physical Exam  Cardiovascular:      Rate and Rhythm: Normal rate and regular rhythm.      Heart sounds: No murmur heard.  Pulmonary:      Effort: Pulmonary effort is normal.      Breath sounds: Normal breath sounds. No wheezing or rales.       Administrative Statements

## 2024-06-24 NOTE — TELEPHONE ENCOUNTER
Called patient at mobile number.  Left message on answering machine.  I let him know that his chest x-ray showed evidence of pneumonia which will need to be treated with antibiotics.  I stated that I sent prescriptions for 2 antibiotics, Augmentin and azithromycin, to his pharmacy    I asked that Sloan get a follow-up chest x-ray in 6 weeks to make sure that the pneumonia has resolved    Advised him to call the office if any questions or concerns.  Left callback number

## 2024-06-24 NOTE — ASSESSMENT & PLAN NOTE
-Status post balloon angioplasty in the 1990s, CABG in 1995, had PCI in 1999 per chart review  -not on aspirin as patient is on Eliquis  -on beta-blocker  -on simvastatin, Zetia  -goal LDL less than 70  -goal BP less than 130/80

## 2024-06-25 NOTE — ASSESSMENT & PLAN NOTE
"Patient reports onset of cough within the last 2 weeks or so.  He reports ongoing \"heavy chest congestion\".  Cough productive of green phlegm.  Has been afebrile for the last 4 days, states his temperature was running around 100 before them.  Overall feeling a bit better.  Has been taking Mucinex without much improvement in symptoms.  Lungs clear to auscultation today  -We will check x-ray to rule out consolidation  "

## 2024-06-25 NOTE — ASSESSMENT & PLAN NOTE
Creatinine 1.26, EGFR of 55 on most recent set of labs.  This may be near baseline for the patient.  We reviewed potential diagnosis of CKD. Will continue to monitor for now

## 2024-07-15 ENCOUNTER — TELEPHONE (OUTPATIENT)
Age: 89
End: 2024-07-15

## 2024-07-15 DIAGNOSIS — J18.9 PNEUMONIA DUE TO INFECTIOUS ORGANISM, UNSPECIFIED LATERALITY, UNSPECIFIED PART OF LUNG: Primary | ICD-10-CM

## 2024-07-15 NOTE — TELEPHONE ENCOUNTER
Patient called for xray results. Told him we have not received the results yet. As soon as we get the results, someone will call him.

## 2024-07-15 NOTE — TELEPHONE ENCOUNTER
Myranda from [facility] called.  Patient is currently there for a chest xray, but they do not have the order.  Please fax to:  [phone].

## 2024-07-16 ENCOUNTER — TELEPHONE (OUTPATIENT)
Age: 89
End: 2024-07-16

## 2024-07-16 DIAGNOSIS — R91.1 NODULE OF UPPER LOBE OF RIGHT LUNG: Primary | ICD-10-CM

## 2024-07-16 NOTE — PROGRESS NOTES
"CXR report from 7/15 reviewed, shows \"Patchy irregular nodule RU lung zone could represent atelectasis, infiltrate, scar or pulmonary mass\"  On chart review, he was recently treated with Augmentin and azithromycin for community-acquired pneumonia of the right lung prescribed on 6/24 that was noted on chest x-ray.  At the time he was instructed to repeat a chest x-ray in 6 weeks which was completed on 7/15 with the above findings.  Our medical assistant contacted him by phone, he reports some of the symptoms have improved however he continues to experience productive cough and general malaise.  Denies fevers, chills  -I like to proceed with CT chest and lab work.  Orders have been placed in his chart.   "

## 2024-07-16 NOTE — TELEPHONE ENCOUNTER
Received call from Alley with [facility] regarding results of patient's CXR on 7/15/24.  Verbally read results of:    Patchy irregular nodule RU lung zone could represent atelectasis, infiltrate, scar or pulmonary mass.    She will also fax the results to the office.

## 2024-07-16 NOTE — TELEPHONE ENCOUNTER
Patient called stating that he had his blood work done yesterday at [facility] and they will be faxing his results to the office today.  Provided patient with direct fax #.

## 2024-07-17 ENCOUNTER — TELEPHONE (OUTPATIENT)
Dept: INTERNAL MEDICINE CLINIC | Facility: CLINIC | Age: 89
End: 2024-07-17

## 2024-07-17 DIAGNOSIS — E83.42 HYPOMAGNESEMIA: Primary | ICD-10-CM

## 2024-07-17 RX ORDER — MAGNESIUM L-LACTATE 84 MG
84 TABLET, EXTENDED RELEASE ORAL DAILY
Start: 2024-07-17

## 2024-07-17 NOTE — TELEPHONE ENCOUNTER
Patient called back with fax number for [facility].    Please fax CT order to fax # [phone]

## 2024-07-17 NOTE — TELEPHONE ENCOUNTER
Patient called, states he is unable to access results on MyChart.Contacted practice/clinical, patient warm transferred to (Jas).

## 2024-07-17 NOTE — TELEPHONE ENCOUNTER
Phone call from Sloan: patient is asking for lab results stating he received an email from us stating the results from an outside source were in My Chart.  He is unable to get into My Chart and said IT was no help so he is calling here.  I explained to him that am not able to help him and he needs to call IT again.  He is insisting that the results are at our office.  I told him we have been waiting for them since yesterday and have not received them yet.  He was not happy and hung up on me.

## 2024-07-17 NOTE — TELEPHONE ENCOUNTER
Lab work from [facility], results dated 7.15.24, were seen by Dr. Molina @ 15:30 this afternoon.  As per Dr. Molina, there is no need for an antibiotic at this time.  He would like Mr Fernando to start OTC magnesium once daily.  Dr Molina is also asking that the CT scan of the chest be done.  If Mr. Fernando has any other issues, he should go to Urgent Care for treatment.  I spoke with Mr Fernando and gave all of the above information.  He will start Mg daily and get the CT scan done.  He will call back with the fax number of [facility] where the order can be faxed.

## 2024-07-17 NOTE — TELEPHONE ENCOUNTER
We received pts Xray yesterday and  asked me  if the pt was feeling better and he stated that he was a bit better but still had the same feeling except for no fever not taking muccinex a lot of phlegm in the am and that he was hydrating more. Dr. Molina wanted pt to do blood work and a ct scan and the pt stated that he was not going to do hs blood work because he did it already asked the pt were did he go and he stated that he did it were he did his x-rays and I told him tat we have not received his result and if he could redo them again and he said no and that he is not going to do the ct scan neither that all he wanted was antibiotics and I relayed the message to Dr. Molina  stated that there is no indication for antibiotics at this time.

## 2024-07-18 ENCOUNTER — TELEPHONE (OUTPATIENT)
Age: 89
End: 2024-07-18

## 2024-07-18 NOTE — TELEPHONE ENCOUNTER
Pt called back and advised he was able to schedule appt at [facility] for tomorrow 7/19 @ 11:30. He also stated hospital called him and advised no insurance referral is needed.

## 2024-07-18 NOTE — TELEPHONE ENCOUNTER
Patient is requesting an insurance referral for the following specialty: Imaging     Test Name / Order Name: CT chest wo contrast     DX Code: Nodule of upper lobe of right lung [R91.1]     Date Of Service: Tried to have it done 7/18/2024, waiting for authorization to be completed    Location/Facility Name/Address/Phone #:     [address] [address]   Phone: [phone]    Location / Facility NPI:     Best Phone # To Reach The Patient: [phone]    Please call patient to advise when referral is completed.

## 2024-07-18 NOTE — TELEPHONE ENCOUNTER
Chelo from [facility] called to request referral for chest CT to be faxed to [phone]. She stated they will need this prior to scheduling pt. Thank you.

## 2024-07-18 NOTE — TELEPHONE ENCOUNTER
Rx for CT scan was faxed to [facility] to 2 separate fax numbers: [phone] & [phone].

## 2024-07-18 NOTE — TELEPHONE ENCOUNTER
Patient called from [facility] CT Radiology Department and requested for the CT chest wo contrast order sent to fax number [phone]. I called the office and spoke with Darleen. She faxed over the order to the hospital.    I spoke with Valery at the hospital and confirmed the order was received. The patient needs an authorization for the procedure.

## 2024-07-24 ENCOUNTER — TELEPHONE (OUTPATIENT)
Age: 89
End: 2024-07-24

## 2024-07-24 PROBLEM — R05.1 ACUTE COUGH: Status: RESOLVED | Noted: 2024-06-24 | Resolved: 2024-07-24

## 2024-07-24 NOTE — TELEPHONE ENCOUNTER
Patient called for the results of his CT chest wo contrast.  Please review and return patients call.

## 2024-07-25 ENCOUNTER — TELEPHONE (OUTPATIENT)
Dept: INTERNAL MEDICINE CLINIC | Facility: CLINIC | Age: 89
End: 2024-07-25

## 2024-07-25 DIAGNOSIS — R91.1 LESION OF RIGHT LUNG: Primary | ICD-10-CM

## 2024-07-25 NOTE — TELEPHONE ENCOUNTER
Called [facility] radiology group to [phone] for pts CT SCAN OF CHEST they will be faxing it

## 2024-07-25 NOTE — TELEPHONE ENCOUNTER
Reviewed results of noncontrast CT of the chest completed 7/19/2024 at [facility].  This was obtained to follow-up on prior chest x-ray which showed irregular right upper lung nodule    Chest CT shows irregular spiculated right upper lobe lesion, suspicious for possible neoplasm.  Recommend evaluation with pulmonology to discuss further evaluation and biopsy/best approach for biopsy.  Referral placed    We reviewed that we cannot say definitively what this is but that cancer is a possibility.  [place] going out of [place] 1 month on August 6th and I did encourage him to try to avoid delay in scheduling appointment with pulmonology best he can.  He voiced his understanding.  All questions answered

## 2024-09-09 ENCOUNTER — CONSULT (OUTPATIENT)
Dept: PULMONOLOGY | Facility: CLINIC | Age: 89
End: 2024-09-09
Payer: MEDICARE

## 2024-09-09 ENCOUNTER — TELEPHONE (OUTPATIENT)
Dept: PULMONOLOGY | Facility: CLINIC | Age: 89
End: 2024-09-09

## 2024-09-09 VITALS
DIASTOLIC BLOOD PRESSURE: 70 MMHG | TEMPERATURE: 96.8 F | OXYGEN SATURATION: 97 % | HEART RATE: 53 BPM | BODY MASS INDEX: 24.44 KG/M2 | HEIGHT: 69 IN | SYSTOLIC BLOOD PRESSURE: 122 MMHG | WEIGHT: 165 LBS

## 2024-09-09 DIAGNOSIS — D49.1 NEOPLASM OF UPPER LOBE OF RIGHT LUNG: Primary | ICD-10-CM

## 2024-09-09 DIAGNOSIS — I48.92 ATRIAL FLUTTER, UNSPECIFIED TYPE (HCC): ICD-10-CM

## 2024-09-09 DIAGNOSIS — R91.1 SOLITARY PULMONARY NODULE: ICD-10-CM

## 2024-09-09 DIAGNOSIS — R91.1 LESION OF RIGHT LUNG: ICD-10-CM

## 2024-09-09 DIAGNOSIS — R91.1 PULMONARY NODULE: ICD-10-CM

## 2024-09-09 PROCEDURE — 99204 OFFICE O/P NEW MOD 45 MIN: CPT | Performed by: INTERNAL MEDICINE

## 2024-09-09 NOTE — ASSESSMENT & PLAN NOTE
Recent chest CT performed at outside institution shows a 2 cm right upper lobe pulmonary nodule.  There are no prior studies for comparison.  I have recommended PET scan to gather additional information both regarding the nodule itself as well as whether there is any mediastinal involvement.  This will help determine our next course of action, specifically a biopsy plan.  We will do our best to coordinate the workup prior to his departure back to [place] at the beginning of October.  I reached out to [facility] and they do have a PET scanner, performed every Tuesday in their mobile unit.  Will make arrangements for the next available appointment.  I will need to review the images in order to determine if we will perform tissue sampling via CT guidance or navigational bronchoscopy, assuming tissue is necessary.

## 2024-09-09 NOTE — TELEPHONE ENCOUNTER
Was informed by  that PT wanted to get CT done at a facility closer to home. I contacted [facility] to set up a time and day for the PT to go to the facility and get the CT done. PT is scheduled for 9/17 at 1PM at [facility]. Office notes and the order for the CT were faxed to: [phone].      was informed of the appt time and date.    Called PT after scheduling appt to inform about time and day. Also provided a call back number in case this doesn't work for him and his schedule.

## 2024-09-09 NOTE — PROGRESS NOTES
Pulmonary Outpatient Consultation Note   DARREN Fernando 88 y.o. male MRN: [id number]  9/9/2024    Referring provider: Andres Henson Do  [address]  [address]  [place] [address]     Assessment/Plan:      Pulmonary nodule  Recent chest CT performed at outside institution shows a 2 cm right upper lobe pulmonary nodule.  There are no prior studies for comparison.  I have recommended PET scan to gather additional information both regarding the nodule itself as well as whether there is any mediastinal involvement.  This will help determine our next course of action, specifically a biopsy plan.  We will do our best to coordinate the workup prior to his departure back to [place] at the beginning of October.  I reached out to [facility] and they do have a PET scanner, performed every Tuesday in their mobile unit.  Will make arrangements for the next available appointment.  I will need to review the images in order to determine if we will perform tissue sampling via CT guidance or navigational bronchoscopy, assuming tissue is necessary.    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 male who is here today for evaluation regarding right upper lobe pulmonary nodule.  In June of this year, he developed cough, congestion and concern for pneumonia.  He underwent chest x-ray which confirmed pneumonia for which he was treated with a course of antibiotics.  The cough has since mostly resolved.  He occasionally has  yellow to green sputum production.  He has no shortness of breath.  There was additional concern for a pulmonary nodule, prompting CT of the chest to be performed.  I have reviewed the report from [facility] which comments on a 1.7 x 2.0 spiculated nodule in the right upper lobe.  I do not have those images for my personal review.  Patient has no personal history of lung disease including asthma or COPD.  He has a very remote smoking history, quitting at the age of 37.  Prior to that he did smoke 2 packs/day.  He had lost 10 pounds due to various factors but has since gained most of it back.  He worked as a  and is now retired.  He has no pets at home.  He splits his time between [place] and [place] with plans to go back to [place] in the beginning of October.    Patient also had an episode of pneumonia when he was a sophomore in college and recalls being quite sick at that time.  He does not remember which side the pneumonia was on.    From a family history standpoint, sister had lung cancer and daughter has asthma    Patient also has a history of paroxysmal atrial flutter, maintained on Eliquis therapy.    Review of Systems otherwise negative    Historical Information   History reviewed. No pertinent past medical history.  Past Surgical History:   Procedure Laterality Date    ANGIOPLASTY      CATARACT EXTRACTION Bilateral     CORONARY ARTERY BYPASS GRAFT       Family History   Problem Relation Age of Onset    Alzheimer's disease Mother     Cancer Father        Social History     Tobacco Use   Smoking Status Former   Smokeless Tobacco Never       Meds/Allergies     Current Outpatient Medications:     allopurinol (ZYLOPRIM) 300 mg tablet, Take 1 tablet by mouth daily, Disp: , Rfl:     Cholecalciferol (VITAMIN D3) 5000 units CAPS, Take 1 capsule by mouth daily, Disp: , Rfl:     Eliquis 5 MG, TAKE 1 TABLET BY MOUTH TWICE A DAY, Disp: 180 tablet, Rfl: 0    Esomeprazole Magnesium (NEXIUM PO),  "Take 20 mg by mouth daily , Disp: , Rfl:     ezetimibe (ZETIA) 10 mg tablet, Take 10 mg by mouth daily, Disp: , Rfl:     magnesium (MAGTAB) 84 MG (7MEQ) TBCR, Take 1 tablet (84 mg total) by mouth daily, Disp: , Rfl:     methylprednisolone (MEDROL) 4 mg tablet, Take 2 mg by mouth daily , Disp: , Rfl:     metoprolol succinate (TOPROL-XL) 50 mg 24 hr tablet, TAKE 1 TABLET BY MOUTH EVERY DAY, Disp: 90 tablet, Rfl: 0    Misc Natural Products (GLUCOSAMINE CHOND COMPLEX/MSM PO), Take by mouth 2 (two) times a day , Disp: , Rfl:     Multiple Vitamins-Minerals (MULTIVITAMIN ADULT PO), Take 1 tablet by mouth daily, Disp: , Rfl:     simvastatin (ZOCOR) 40 mg tablet, Take 40 mg by mouth daily at bedtime, Disp: , Rfl:   No Known Allergies    Vitals: Blood pressure 122/70, pulse (!) 53, temperature (!) 96.8 °F (36 °C), temperature source Tympanic, height 5' 9\" (1.753 m), weight 74.8 kg (165 lb), SpO2 97%., Body mass index is 24.37 kg/m². Oxygen Therapy  SpO2: 97 %    Physical Exam   Physical Exam  Vitals reviewed.   Constitutional:       General: He is not in acute distress.     Appearance: He is well-developed.   Eyes:      General: No scleral icterus.  Neck:      Vascular: No JVD.   Cardiovascular:      Rate and Rhythm: Normal rate and regular rhythm.   Pulmonary:      Breath sounds: No wheezing, rhonchi or rales.   Abdominal:      Tenderness: There is no abdominal tenderness.   Skin:     General: Skin is warm and dry.   Neurological:      Mental Status: He is alert and oriented to person, place, and time.   Psychiatric:         Mood and Affect: Mood normal.         Labs: I have personally reviewed pertinent lab results.  Lab Results   Component Value Date    WBC 7.76 09/13/2018    HGB 12.7 09/13/2018    HCT 39.0 09/13/2018    MCV 98 09/13/2018     09/13/2018     Lab Results   Component Value Date    CALCIUM 7.8 (L) 09/13/2018    K 3.6 09/13/2018    CO2 26 09/13/2018     09/13/2018    BUN 11 09/13/2018    CREATININE " "0.72 09/13/2018     No results found for: \"IGE\"  No results found for: \"ALT\", \"AST\", \"GGT\", \"ALKPHOS\", \"BILITOT\"    Imaging and other studies: I have personally reviewed pertinent reports.   and I have personally reviewed pertinent films in PACS  Chest x-ray performed in June 2024 at outside institution is personally reviewed.  There is a right upper lobe opacity     CT of the chest performed in July 2024 comments on a 1.7 x 2.0 cm right upper lobe nodule.  I do not have those images for my review    "

## 2024-09-23 ENCOUNTER — TELEPHONE (OUTPATIENT)
Dept: PULMONOLOGY | Facility: CLINIC | Age: 89
End: 2024-09-23

## 2024-09-23 NOTE — TELEPHONE ENCOUNTER
Outside imaging    Imaging disk received through mail on 9/23/2024 in [facility].     Sending outside imaging disk to radiology room. 9/23/2024

## 2024-09-24 ENCOUNTER — TELEPHONE (OUTPATIENT)
Dept: PULMONOLOGY | Facility: CLINIC | Age: 89
End: 2024-09-24

## 2024-09-24 NOTE — TELEPHONE ENCOUNTER
Pt returning Dr. Starr's phone call. Informed of results per Dr. Starr. Pt verbalized understanding. Offered to have provider call him back if he would like to discuss further but pt declines.

## 2024-09-24 NOTE — TELEPHONE ENCOUNTER
PET scan performed at outside institution on 9/21/2024.  Report is reviewed and there has been interval resolution of the right upper lobe opacity that was in question.  No further workup is necessary.  I left a voicemail for the patient to return my call to discuss.

## 2025-06-30 ENCOUNTER — TELEPHONE (OUTPATIENT)
Dept: INTERNAL MEDICINE CLINIC | Facility: CLINIC | Age: OVER 89
End: 2025-06-30

## 2025-06-30 NOTE — TELEPHONE ENCOUNTER
Called pt to schedule [facility] appt. Appt was made for 8/11/2025. Pt also asked to schedule appt for wife for the same day which was made as well.

## 2025-08-11 ENCOUNTER — OFFICE VISIT (OUTPATIENT)
Dept: INTERNAL MEDICINE CLINIC | Facility: CLINIC | Age: OVER 89
End: 2025-08-11
Payer: MEDICARE